# Patient Record
Sex: MALE | Race: WHITE | NOT HISPANIC OR LATINO | Employment: UNEMPLOYED | URBAN - METROPOLITAN AREA
[De-identification: names, ages, dates, MRNs, and addresses within clinical notes are randomized per-mention and may not be internally consistent; named-entity substitution may affect disease eponyms.]

---
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
## 2023-01-03 ENCOUNTER — OFFICE VISIT (OUTPATIENT)
Dept: FAMILY MEDICINE | Facility: CLINIC | Age: 18
End: 2023-01-03
Payer: COMMERCIAL

## 2023-01-03 VITALS
OXYGEN SATURATION: 98 % | RESPIRATION RATE: 12 BRPM | WEIGHT: 134.4 LBS | TEMPERATURE: 97.7 F | DIASTOLIC BLOOD PRESSURE: 69 MMHG | SYSTOLIC BLOOD PRESSURE: 106 MMHG | HEIGHT: 66 IN | BODY MASS INDEX: 21.6 KG/M2 | HEART RATE: 67 BPM

## 2023-01-03 DIAGNOSIS — M79.18 TENDERNESS OF BRACHIORADIALIS MUSCLE: Primary | ICD-10-CM

## 2023-01-03 DIAGNOSIS — Z23 NEED FOR PROPHYLACTIC VACCINATION AND INOCULATION AGAINST INFLUENZA: ICD-10-CM

## 2023-01-03 DIAGNOSIS — A04.8 H. PYLORI INFECTION: ICD-10-CM

## 2023-01-03 PROCEDURE — 90472 IMMUNIZATION ADMIN EACH ADD: CPT | Performed by: STUDENT IN AN ORGANIZED HEALTH CARE EDUCATION/TRAINING PROGRAM

## 2023-01-03 PROCEDURE — 90471 IMMUNIZATION ADMIN: CPT | Performed by: STUDENT IN AN ORGANIZED HEALTH CARE EDUCATION/TRAINING PROGRAM

## 2023-01-03 PROCEDURE — 90715 TDAP VACCINE 7 YRS/> IM: CPT | Performed by: STUDENT IN AN ORGANIZED HEALTH CARE EDUCATION/TRAINING PROGRAM

## 2023-01-03 PROCEDURE — 99213 OFFICE O/P EST LOW 20 MIN: CPT | Mod: 25 | Performed by: STUDENT IN AN ORGANIZED HEALTH CARE EDUCATION/TRAINING PROGRAM

## 2023-01-03 PROCEDURE — 90686 IIV4 VACC NO PRSV 0.5 ML IM: CPT | Performed by: STUDENT IN AN ORGANIZED HEALTH CARE EDUCATION/TRAINING PROGRAM

## 2023-01-03 NOTE — PROGRESS NOTES
Preceptor Attestation:    I discussed the patient with the resident and evaluated the patient in person. I have verified the content of the note, which accurately reflects my assessment of the patient and the plan of care.   Supervising Physician:  Sarkis Willis MD.

## 2023-01-03 NOTE — LETTER
January 3, 2023      Ranjit Mojica  [address] [facility] [address]  [place] [address]        To Whom It May Concern:    Ranjit Mojica was seen in our clinic. He may return to work with the following: limited to 8 hour workday for 2 weeks.      Sincerely,        Mayela Sommers MD
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
## 2023-01-03 NOTE — PATIENT INSTRUCTIONS
Thank you for coming to [facility] for your care.  It was a pleasure to take care of you!    Here is what we discussed:  Take naproxen 1 pill 2 times per day with food for swelling and pain in your arm  Apply gel to sore muscle 4 times per day  Send the H.pylori test back  Come back in 3 weeks to see Dr. Mcelroy.     Please call our clinic ([phone]) or send a message via seedtag with any questions or concerns!    Dr. Mayela Sommers

## 2023-01-20 ENCOUNTER — OFFICE VISIT (OUTPATIENT)
Dept: FAMILY MEDICINE | Facility: CLINIC | Age: 18
End: 2023-01-20
Payer: COMMERCIAL

## 2023-01-20 VITALS
RESPIRATION RATE: 18 BRPM | OXYGEN SATURATION: 100 % | DIASTOLIC BLOOD PRESSURE: 73 MMHG | HEART RATE: 79 BPM | SYSTOLIC BLOOD PRESSURE: 111 MMHG

## 2023-01-20 DIAGNOSIS — R20.0 NUMBNESS AND TINGLING OF RIGHT UPPER EXTREMITY: Primary | ICD-10-CM

## 2023-01-20 DIAGNOSIS — G47.9 SLEEPING DIFFICULTIES: ICD-10-CM

## 2023-01-20 DIAGNOSIS — R20.2 NUMBNESS AND TINGLING OF RIGHT UPPER EXTREMITY: Primary | ICD-10-CM

## 2023-01-20 DIAGNOSIS — R63.4 WEIGHT LOSS: ICD-10-CM

## 2023-01-20 PROCEDURE — 99214 OFFICE O/P EST MOD 30 MIN: CPT | Mod: GC | Performed by: STUDENT IN AN ORGANIZED HEALTH CARE EDUCATION/TRAINING PROGRAM

## 2023-01-20 RX ORDER — VITAMIN B COMPLEX
1 TABLET ORAL DAILY
Qty: 90 TABLET | Refills: 3 | Status: SHIPPED | OUTPATIENT
Start: 2023-01-20 | End: 2023-02-17

## 2023-01-20 RX ORDER — HYDROXYZINE HYDROCHLORIDE 25 MG/1
25 TABLET, FILM COATED ORAL EVERY 8 HOURS PRN
Qty: 60 TABLET | Refills: 0 | Status: SHIPPED | OUTPATIENT
Start: 2023-01-20 | End: 2023-02-17

## 2023-01-20 NOTE — PATIENT INSTRUCTIONS
Take hydroxyzine 25 mg at bedtime to help with sleep.  You can take an extra pill during the day.  It can make you sleepy.     You will get a phone call about scheduling an EMG to check the nerve pain    Take vitamin D everyday

## 2023-01-20 NOTE — LETTER
January 20, 2023      Ranjit Mojica  [address] [facility] [address]  [place] [address]        To Whom It May Concern:    Ranjit Mojica was seen in our clinic. He may return to school without restrictions.      Sincerely,        Mayela Sommers MD
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
## 2023-01-20 NOTE — LETTER
January 20, 2023      Ranjit Mojica  [address] [facility] [address]  [place] [address]        To Whom It May Concern:      Ranjit Mojica was seen in our clinic for injury.  Please do not schedule patient for overtime as he needs adequate rest for recovery.       Sincerely,        Mayela Sommers MD

## 2023-01-20 NOTE — NURSING NOTE
Due to patient being non-English speaking/uses sign language, an  was used for this visit. Only for face-to-face interpretation by an external agency, date and length of interpretation can be found on the scanned worksheet.     name: [id number]  Agency: [employer]  Language: Mingo   Telephone number: [phone]  Type of interpretation: Telephone, spoken

## 2023-01-20 NOTE — PROGRESS NOTES
Preceptor Attestation:    I discussed the patient with the resident and evaluated the patient in person. I have verified the content of the note, which accurately reflects my assessment of the patient and the plan of care.   Supervising Physician:  Pipe Gonzalez MD.

## 2023-01-23 ENCOUNTER — OFFICE VISIT (OUTPATIENT)
Dept: FAMILY MEDICINE | Facility: CLINIC | Age: 18
End: 2023-01-23
Payer: COMMERCIAL

## 2023-01-23 VITALS
RESPIRATION RATE: 20 BRPM | TEMPERATURE: 98.7 F | SYSTOLIC BLOOD PRESSURE: 117 MMHG | WEIGHT: 120 LBS | HEART RATE: 69 BPM | DIASTOLIC BLOOD PRESSURE: 78 MMHG | OXYGEN SATURATION: 99 % | BODY MASS INDEX: 19.67 KG/M2

## 2023-01-23 DIAGNOSIS — M62.838 MUSCLE SPASM: Primary | ICD-10-CM

## 2023-01-23 DIAGNOSIS — M54.2 NECK PAIN: ICD-10-CM

## 2023-01-23 PROCEDURE — 99213 OFFICE O/P EST LOW 20 MIN: CPT | Performed by: STUDENT IN AN ORGANIZED HEALTH CARE EDUCATION/TRAINING PROGRAM

## 2023-01-23 RX ORDER — BACLOFEN 10 MG/1
10 TABLET ORAL 3 TIMES DAILY
Qty: 15 TABLET | Refills: 0 | Status: SHIPPED | OUTPATIENT
Start: 2023-01-23 | End: 2023-03-09

## 2023-01-23 NOTE — LETTER
January 23, 2023      Ranjit Mojica  [address] [facility] [address]  [place] [address]        To Whom It May Concern:    Ranjit Mojica  was seen on 1/23/23. He should be light duty at work for 1 week due to injury      Sincerely,        Reena Felix PA-C

## 2023-01-23 NOTE — PROGRESS NOTES
ASSESSMENT & PLAN:   Diagnoses and all orders for this visit:  Muscle spasm  -     baclofen (LIORESAL) 10 MG tablet; Take 1 tablet (10 mg) by mouth 3 times daily  Neck pain    Bilateral shoulder/arm pain ongoing x 1 month, atraumatic. New onset neck pain/tightness today. Start baclofen as needed for muscle spasm. Work note provided to be light duty. Patient has been evaluated multiple times within the past month for same symptoms. He has an EMG ordered but has not gotten a call to schedule. Patient was provided with phone number to call and schedule this.     No follow-ups on file.    Patient Instructions   Your doctor has already ordered an EMG for you.   Call [phone] to get this scheduled.    May take baclofen as needed for muscle spasm/tightness.   Continue using diclofenac cream.       At the end of the encounter, I discussed results, diagnosis, medications. Discussed red flags for immediate return to clinic/ER, as well as indications for follow up if no improvement. Patient and/or caregiver understood and agreed to plan. Patient was stable for discharge.    ------------------------------------------------------------------------  SUBJECTIVE  Patient presents with:  Musculoskeletal Problem: Rt shoulder on/off sharp pain x 1 month. Pt states both hands but Rt hand more tough/stiff; unable to move. Pain radiates to Rt side of neck and pain both shoulders, tingling sensations both fingers.    HPI  Ranjit Mojica is a(n) 17 year old male presenting to clinic today for bilateral shoulder and arm pain x 1 month. No injury. Endorses right arm and hand stiffness. Symptoms worse with moving his arms. They tighten up and he cannot move them. Today began having neck pain. Endorses intermittent numbness, tingling in hands. Aggravated by movement. Improvement with nothing. Has been using diclofenac cream without relief. He has been seen multiple times by PCP for this. Initially was started on naproxen and diclofenac  without relief. He was seen again 3 days ago and an EMG was ordered. He has not received a phone call about getting this scheduled.      used throughout visit.     Review of Systems    Current Outpatient Medications   Medication Sig Dispense Refill     baclofen (LIORESAL) 10 MG tablet Take 1 tablet (10 mg) by mouth 3 times daily 15 tablet 0     benzoyl peroxide 5 % topical gel Apply topically daily (Patient not taking: Reported on 1/20/2023) 90 g 3     diclofenac (VOLTAREN) 1 % topical gel Apply 4 g topically 4 times daily (Patient not taking: Reported on 1/23/2023) 350 g 0     doxycycline hyclate (VIBRAMYCIN) 100 MG capsule Take 1 capsule (100 mg) by mouth daily for 90 days (Patient not taking: Reported on 1/20/2023) 90 capsule 0     famotidine (PEPCID) 20 MG tablet Take 1 tablet (20 mg) by mouth 2 times daily (Patient not taking: Reported on 1/20/2023) 30 tablet 3     hydrOXYzine (ATARAX) 25 MG tablet Take 1 tablet (25 mg) by mouth every 8 hours as needed for anxiety or other (sleep) (Patient not taking: Reported on 1/23/2023) 60 tablet 0     naproxen (NAPROSYN) 375 MG tablet Take 1 tablet (375 mg) by mouth 2 times daily (with meals) (Patient not taking: Reported on 1/20/2023) 20 tablet 0     polyethylene glycol (MIRALAX) 17 GM/Dose powder Take 17 g by mouth daily (Patient not taking: Reported on 1/20/2023) 510 g 1     Vitamin D3 (CHOLECALCIFEROL) 25 mcg (1000 units) tablet Take 1 tablet (25 mcg) by mouth daily (Patient not taking: Reported on 1/23/2023) 90 tablet 3     Problem List:  2022-05: Encounter for health examination of refugee    No Known Allergies      OBJECTIVE  Vitals:    01/23/23 1141   BP: 117/78   BP Location: Right arm   Patient Position: Sitting   Cuff Size: Adult Regular   Pulse: 69   Resp: 20   Temp: 98.7  F (37.1  C)   TempSrc: Oral   SpO2: 99%   Weight: 54.4 kg (120 lb)     Physical Exam   GENERAL: healthy, alert, no acute distress.   MSK: no spinous process tenderness of cervical  or thoracic spine. Tenderness with palpation of cervical paraspinal muscles. Mild tenderness palpation of upper trapezius. No bony tenderness in bilateral arms or hands. Slightly decreased forward flexion of right shoulder. 5/5 elbow flexion and extension.  strength strong and symmetric. Negative Tinel's bilaterally. Sensation bilateral upper extremities intact and symmetric.      No results found for any visits on 01/23/23.

## 2023-01-23 NOTE — LETTER
January 23, 2023      Ranjit Mojica  [address] [facility] [address]  [place] [address]        To Whom It May Concern:    Ranjit Mojica  was seen on 1/23/2023.  Please excuse him today due to injury.        Sincerely,        Reena Felix PA-C

## 2023-01-23 NOTE — PATIENT INSTRUCTIONS
Your doctor has already ordered an EMG for you.   Call [phone] to get this scheduled.    May take baclofen as needed for muscle spasm/tightness.   Continue using diclofenac cream.

## 2023-02-14 ENCOUNTER — OFFICE VISIT (OUTPATIENT)
Dept: FAMILY MEDICINE | Facility: CLINIC | Age: 18
End: 2023-02-14
Payer: COMMERCIAL

## 2023-02-14 VITALS
OXYGEN SATURATION: 97 % | DIASTOLIC BLOOD PRESSURE: 70 MMHG | HEART RATE: 78 BPM | WEIGHT: 129.6 LBS | HEIGHT: 66 IN | TEMPERATURE: 97.6 F | SYSTOLIC BLOOD PRESSURE: 113 MMHG | RESPIRATION RATE: 16 BRPM | BODY MASS INDEX: 20.83 KG/M2

## 2023-02-14 DIAGNOSIS — R63.4 WEIGHT LOSS: ICD-10-CM

## 2023-02-14 DIAGNOSIS — F33.2 SEVERE EPISODE OF RECURRENT MAJOR DEPRESSIVE DISORDER, WITHOUT PSYCHOTIC FEATURES (H): ICD-10-CM

## 2023-02-14 DIAGNOSIS — F41.1 GAD (GENERALIZED ANXIETY DISORDER): ICD-10-CM

## 2023-02-14 DIAGNOSIS — H53.8 BLURRED VISION: ICD-10-CM

## 2023-02-14 DIAGNOSIS — A04.8 H. PYLORI INFECTION: ICD-10-CM

## 2023-02-14 LAB
ALBUMIN SERPL BCG-MCNC: 4.4 G/DL (ref 3.5–5.2)
ALP SERPL-CCNC: 108 U/L (ref 40–129)
ALT SERPL W P-5'-P-CCNC: 16 U/L (ref 10–50)
ANION GAP SERPL CALCULATED.3IONS-SCNC: 10 MMOL/L (ref 7–15)
AST SERPL W P-5'-P-CCNC: 22 U/L (ref 10–50)
BILIRUB SERPL-MCNC: 0.4 MG/DL
BUN SERPL-MCNC: 12.5 MG/DL (ref 6–20)
CALCIUM SERPL-MCNC: 9.6 MG/DL (ref 8.6–10)
CHLORIDE SERPL-SCNC: 104 MMOL/L (ref 98–107)
CREAT SERPL-MCNC: 0.85 MG/DL (ref 0.67–1.17)
DEPRECATED HCO3 PLAS-SCNC: 27 MMOL/L (ref 22–29)
ERYTHROCYTE [DISTWIDTH] IN BLOOD BY AUTOMATED COUNT: 11.6 % (ref 10–15)
GFR SERPL CREATININE-BSD FRML MDRD: >90 ML/MIN/1.73M2
GLUCOSE SERPL-MCNC: 82 MG/DL (ref 70–99)
HCT VFR BLD AUTO: 45.9 % (ref 40–53)
HGB BLD-MCNC: 15.6 G/DL (ref 13.3–17.7)
MCH RBC QN AUTO: 27.9 PG (ref 26.5–33)
MCHC RBC AUTO-ENTMCNC: 34 G/DL (ref 31.5–36.5)
MCV RBC AUTO: 82 FL (ref 78–100)
PLATELET # BLD AUTO: 137 10E3/UL (ref 150–450)
POTASSIUM SERPL-SCNC: 4.2 MMOL/L (ref 3.4–5.3)
PROT SERPL-MCNC: 6.9 G/DL (ref 6.4–8.3)
RBC # BLD AUTO: 5.59 10E6/UL (ref 4.4–5.9)
SODIUM SERPL-SCNC: 141 MMOL/L (ref 136–145)
TSH SERPL DL<=0.005 MIU/L-ACNC: 0.53 UIU/ML (ref 0.5–4.3)
WBC # BLD AUTO: 3.2 10E3/UL (ref 4–11)

## 2023-02-14 PROCEDURE — 85027 COMPLETE CBC AUTOMATED: CPT | Performed by: STUDENT IN AN ORGANIZED HEALTH CARE EDUCATION/TRAINING PROGRAM

## 2023-02-14 PROCEDURE — 36415 COLL VENOUS BLD VENIPUNCTURE: CPT | Performed by: STUDENT IN AN ORGANIZED HEALTH CARE EDUCATION/TRAINING PROGRAM

## 2023-02-14 PROCEDURE — 80053 COMPREHEN METABOLIC PANEL: CPT | Performed by: STUDENT IN AN ORGANIZED HEALTH CARE EDUCATION/TRAINING PROGRAM

## 2023-02-14 PROCEDURE — 84443 ASSAY THYROID STIM HORMONE: CPT | Performed by: STUDENT IN AN ORGANIZED HEALTH CARE EDUCATION/TRAINING PROGRAM

## 2023-02-14 PROCEDURE — 99214 OFFICE O/P EST MOD 30 MIN: CPT | Mod: GC | Performed by: STUDENT IN AN ORGANIZED HEALTH CARE EDUCATION/TRAINING PROGRAM

## 2023-02-14 RX ORDER — ESCITALOPRAM OXALATE 10 MG/1
10 TABLET ORAL DAILY
Qty: 60 TABLET | Refills: 0 | Status: SHIPPED | OUTPATIENT
Start: 2023-02-14 | End: 2023-03-09

## 2023-02-14 ASSESSMENT — ANXIETY QUESTIONNAIRES
5. BEING SO RESTLESS THAT IT IS HARD TO SIT STILL: MORE THAN HALF THE DAYS
1. FEELING NERVOUS, ANXIOUS, OR ON EDGE: NEARLY EVERY DAY
GAD7 TOTAL SCORE: 19
2. NOT BEING ABLE TO STOP OR CONTROL WORRYING: NEARLY EVERY DAY
7. FEELING AFRAID AS IF SOMETHING AWFUL MIGHT HAPPEN: NEARLY EVERY DAY
3. WORRYING TOO MUCH ABOUT DIFFERENT THINGS: NEARLY EVERY DAY
6. BECOMING EASILY ANNOYED OR IRRITABLE: MORE THAN HALF THE DAYS
GAD7 TOTAL SCORE: 19

## 2023-02-14 ASSESSMENT — PATIENT HEALTH QUESTIONNAIRE - PHQ9
5. POOR APPETITE OR OVEREATING: NEARLY EVERY DAY
SUM OF ALL RESPONSES TO PHQ QUESTIONS 1-9: 21

## 2023-02-14 NOTE — LETTER
February 14, 2023      Ranjit Mojica  [address] [facility] [address]  [place] [address]        To whom it may concern:    Ranjit Mojica was seen at [facility] on February 14, 2023. Please excuse his absence for today. Patient should not work more than 40 hours per week to avoid worsening his medical conditions.    Sincerely,    Khoa Mcelroy MD

## 2023-02-14 NOTE — PROGRESS NOTES
"Preceptor Attestation:  Vitals:    02/14/23 1003   BP: 113/70   BP Location: Right arm   Patient Position: Sitting   Cuff Size: Adult Regular   Pulse: 78   Resp: 16   Temp: 97.6  F (36.4  C)   TempSrc: Oral   SpO2: 97%   Weight: 54.4 kg (120 lb)   Height: 1.664 m (5' 5.5\")          I discussed the patient with the resident and evaluated the patient in person. I have verified the content of the note, which accurately reflects my assessment of the patient and the plan of care.   Supervising Physician:  Mary Chandler MD    "

## 2023-02-14 NOTE — PROGRESS NOTES
Assessment and Plan     Addendum 3/13/23:   Patient's H pylori stool antigen test was still positive despite earlier Bismuth based quadruple therapy. Will prescribe salvage therapy with levofloxacin 500mg daily, amoxicillin 750mg TID, and omeprazole 40mg BID for 14 day course. Patient will need to have repeat stool testing to confirm eradication 4 weeks after completing antibiotic/PPI therapy. If still positive, would need to refer to GI for EGD for H pylori culture and sensitivities.     Ranjit was seen today for anxiety, numbness, medication reconciliation and referral.    Diagnoses and all orders for this visit:    Severe episode of recurrent major depressive disorder, without psychotic features (H)  MILDRED (generalized anxiety disorder)  Patient history suggests MDD and MILDRED of 2 months in setting of  stress balancing school, work, and not being with his fiance whose safety he is concerned about. TSH normal. He was agreeable to trial of escitalopram and referral for therapy. Provided a work letter indicating that he should not be required to work overtime.  -     TSH with free T4 reflex  -     Adult Mental [facility] Referral; Future  -     escitalopram (LEXAPRO) 10 MG tablet; Take 1 tablet (10 mg) by mouth daily Take 5mg (half tablet) for the first week    H. pylori infection  Completed treatment months ago, but has not yet provided stool sample to evaluate for eradication.  -     Helicobacter pylori Antigen Stool    Weight loss  BMP, CBC, and TSH were normal other than mild leukopenia and thrombocytopenia not much lower than his baseline. Suspect unintentional weight loss of ~13lbs in ~4 months [142 to 129lb; 120lb weight was only reported by patient] is due to MDD and MILDRED above, though, cannot rule out failed H pylori treatment until stool test completed.   -     CBC with platelets  -     Comprehensive metabolic panel  -     TSH with free T4 reflex  -     Helicobacter pylori Antigen Stool    Blurred  vision  Intermittent bilateral blurry vision. Had ~20/30 vision in clinic. Referral placed.  -     Adult Eye  Referral; Future       Diagnosis or treatment significantly limited by social determinants of health - language barrier; Italian  used  Ordering of each unique test    Options for treatment and follow-up care were reviewed with the patient. Patient engaged in the decision making process and verbalized understanding of the options discussed and agreed with the final plan.    Patient was staffed with attending physician Dr. Chandler.    Khoa Mcelroy MD PGY-3           HPI       Ranjit Mojica is a 17 year old year old male w/ PMH of   Patient Active Problem List   Diagnosis     Encounter for health examination of refugee    who presents for   Chief Complaint   Patient presents with     Anxiety     Pt state feeling better today, pt request for a note to decrease his hour of work and check on med, help increase on appetite     Numbness     Right arm       Medication Reconciliation     Med reviewed, complete course of med provider have given and currently not taking any med     Referral     Like to get a assessment done to determine his nerves problem     Right arm numbness  Has not yet heard regarding scheduling EMG ([phone]), but notes that arm symptoms have improved, but still present.    Eyes  Decreased/blurry vision and seeing shadows/after-image for the past 2 weeks; this occurs mainly at night time, but has occurred a few times during the daytime while at school.     Mood  Also notes worsening anxiety, headache for the past 2 months. Ongoing depression. Stressor includes school, work, and not being with his fiance (she left [place] due to the Taliban and is now in [place]). Notes chronic decreased appetite with associated weight loss;     A Badoo  was used for  this visit.           Review of Systems:   10 point ROS negative other than as specified above.          "Physical Exam:   /70 (BP Location: Right arm, Patient Position: Sitting, Cuff Size: Adult Regular)   Pulse 78   Temp 97.6  F (36.4  C) (Oral)   Resp 16   Ht 1.664 m (5' 5.5\")   Wt 58.8 kg (129 lb 9.6 oz)   SpO2 97%   BMI 21.24 kg/m      Vitals were reviewed and were normal  Wt Readings from Last 4 Encounters:   02/17/23 58.5 kg (129 lb) (10 %, Z= -1.27)*   02/14/23 58.8 kg (129 lb 9.6 oz) (11 %, Z= -1.23)*   01/23/23 54.4 kg (120 lb) (3 %, Z= -1.81)*   01/03/23 61 kg (134 lb 6.4 oz) (17 %, Z= -0.96)*     * Growth percentiles are based on [facility] (Boys, 2-20 Years) data.        Exam:  Constitutional: alert, cooperative, tired   Head: normocephalic  Cardiovascular: appears well perfused; RRR w/o audible murmur  Respiratory: breathing comfortably on RA; bilateral clear lungs w/o wheezing, crackles or rhonchi  Abdomen: soft, non-tender, non-distended; no CVA tenderness  Musculoskeletal: extremities normal- no gross deformities noted, gait normal  Skin: no suspicious lesions or rashes on exposed skin  Neurologic: grossly normal CN  Psychiatric: mentation appears normal      Results:      Results from this visit  Results for orders placed or performed in visit on 02/14/23   CBC with platelets     Status: Abnormal   Result Value Ref Range    WBC Count 3.2 (L) 4.0 - 11.0 10e3/uL    RBC Count 5.59 4.40 - 5.90 10e6/uL    Hemoglobin 15.6 13.3 - 17.7 g/dL    Hematocrit 45.9 40.0 - 53.0 %    MCV 82 78 - 100 fL    MCH 27.9 26.5 - 33.0 pg    MCHC 34.0 31.5 - 36.5 g/dL    RDW 11.6 10.0 - 15.0 %    Platelet Count 137 (L) 150 - 450 10e3/uL   Comprehensive metabolic panel     Status: Normal   Result Value Ref Range    Sodium 141 136 - 145 mmol/L    Potassium 4.2 3.4 - 5.3 mmol/L    Chloride 104 98 - 107 mmol/L    Carbon Dioxide (CO2) 27 22 - 29 mmol/L    Anion Gap 10 7 - 15 mmol/L    Urea Nitrogen 12.5 6.0 - 20.0 mg/dL    Creatinine 0.85 0.67 - 1.17 mg/dL    Calcium 9.6 8.6 - 10.0 mg/dL    Glucose 82 70 - 99 mg/dL    Alkaline " Phosphatase 108 40 - 129 U/L    AST 22 10 - 50 U/L    ALT 16 10 - 50 U/L    Protein Total 6.9 6.4 - 8.3 g/dL    Albumin 4.4 3.5 - 5.2 g/dL    Bilirubin Total 0.4 <=1.2 mg/dL    GFR Estimate >90 >60 mL/min/1.73m2   TSH with free T4 reflex     Status: Normal   Result Value Ref Range    TSH 0.53 0.50 - 4.30 uIU/mL

## 2023-02-14 NOTE — PATIENT INSTRUCTIONS
CRISIS LINES/SERVICES  If in Immediate danger please go to the ER and/or call 9-1-1  Feeling overwhelmed and just need a supportive person to talk through a struggling time?   Toll Free [phone] or text  Support  to [phone] (hours 12 PM - 10 PM CST)  The [place] Warmline provides a fopt-xe-cmsd approach to mental health recovery, support and wellness. Calls are answered by professionally trained Certified Peer Specialists, who have first hand experience living with a mental health condition. (hours 12 PM - 10 PM CST)    Do you feel like you might be in crisis and potentially need professional services?   National Crisis Lines:  5-453-OMPQFDO (1-757.612.7778) - www.[employer]  8-206-048-TALK (8571) - www.suicidepreventionlifeline.org  [place]:  Crisis Text Line: Text [place] to 444815. Free support at your fingertips 24/7  People who text [place] to 280073 will be connected with a counselor. Crisis Text Line is available 24 hours a day, seven days a week.  [facility] Multilingual Crisis Line:  [phone]  [facility]:  COPE - Adult (psychiatric crisis, but cannot transport self): [phone]   COPE - Child: [phone]  Acute Psychiatric Services - [facility] (24 hour crisis walk-in and crisis line): [phone]  [address], [place]  [facility] (Emergency Psychiatric Evaluation): [phone]  [facility]:  [facility] Adult Crisis Line (crisis counseling and walk-in urgent care mental health): [phone]   Adult Residential Crisis Centers:   [employer] operates two Adult Residential Crisis Centers in the area: [facility] and [facility] the [facility] ([place])  These facilities are a step below in-patient hospital care, providing a three to ten-day stay for individuals who are at a moderate but not a high risk for self-harm. The crisis centers and other [employer] programs can be reached through their central screening at [phone].  Domestic  Violence:  [place] Domestic Violence Crisis Line (24-hour [place] crisis line) 9-292-134-2486    If in Immediate danger please go to the ER and/or call 9-1-1

## 2023-02-17 ENCOUNTER — OFFICE VISIT (OUTPATIENT)
Dept: FAMILY MEDICINE | Facility: CLINIC | Age: 18
End: 2023-02-17
Payer: COMMERCIAL

## 2023-02-17 VITALS
BODY MASS INDEX: 21.14 KG/M2 | OXYGEN SATURATION: 99 % | HEART RATE: 85 BPM | WEIGHT: 129 LBS | SYSTOLIC BLOOD PRESSURE: 124 MMHG | DIASTOLIC BLOOD PRESSURE: 84 MMHG | TEMPERATURE: 98.3 F | RESPIRATION RATE: 20 BRPM

## 2023-02-17 DIAGNOSIS — J06.9 VIRAL UPPER RESPIRATORY TRACT INFECTION: Primary | ICD-10-CM

## 2023-02-17 LAB — SARS-COV-2 RNA RESP QL NAA+PROBE: NEGATIVE

## 2023-02-17 PROCEDURE — 99213 OFFICE O/P EST LOW 20 MIN: CPT | Mod: CS | Performed by: FAMILY MEDICINE

## 2023-02-17 PROCEDURE — U0005 INFEC AGEN DETEC AMPLI PROBE: HCPCS | Performed by: FAMILY MEDICINE

## 2023-02-17 PROCEDURE — U0003 INFECTIOUS AGENT DETECTION BY NUCLEIC ACID (DNA OR RNA); SEVERE ACUTE RESPIRATORY SYNDROME CORONAVIRUS 2 (SARS-COV-2) (CORONAVIRUS DISEASE [COVID-19]), AMPLIFIED PROBE TECHNIQUE, MAKING USE OF HIGH THROUGHPUT TECHNOLOGIES AS DESCRIBED BY CMS-2020-01-R: HCPCS | Performed by: FAMILY MEDICINE

## 2023-02-17 RX ORDER — NAPROXEN 500 MG/1
500 TABLET ORAL 2 TIMES DAILY WITH MEALS
Qty: 28 TABLET | Refills: 0 | Status: SHIPPED | OUTPATIENT
Start: 2023-02-17 | End: 2023-03-03

## 2023-02-17 NOTE — LETTER
RETURN TO WORK/SCHOOL FORM    2/17/2023    Re: Ranjit Mojica  2005      To Whom It May Concern:     Ranjit Mojica was seen in clinic today.  He has been absent for medical illness.  He may return to work and school without restrictions on 2/21/23.       Restrictions:  None      Davonte Soliz MD  2/17/2023 8:49 AM

## 2023-02-17 NOTE — PROGRESS NOTES
Assessment & Plan     Viral upper respiratory tract infection  Symptoms of URI.  Were having very little influenza in the community right now so we will not check that.  We will check COVID swab for public health reasons.  We will treat empirically with naproxen and given a note excusing him for 5 days total and he can return to work on the sixth day but, if the COVID test is positive he will need to wear a mask for 5 additional days.  - naproxen (NAPROSYN) 500 MG tablet; Take 1 tablet (500 mg) by mouth 2 times daily (with meals) for 14 days  - Symptomatic COVID-19 Virus (Coronavirus) by PCR Nasopharyngeal    Diagnosis or treatment significantly limited by social determinants of health - Language barrier  20 minutes spent on the date of the encounter doing patient visit, documentation and letter          Davonte Soliz MD  [facility]    Options for treatment and/or follow-up care were reviewed with the patient. Ranjit Mojica was engaged and actively involved in the decision making process. He verbalized understanding of the options discussed and was satisfied with the final plan.      Subjective: Ranjit Mojica is a 17 year old coming in with the following concerns:  (phone  - Yoruba)  Flu like symptoms for 2 days: coryza, nasal congestion, body aches, dizzy, sneezing,  No cough.  No sick contacts. Did not check a  COVID test. Tried some OTC flu medicine.  He has been absent from school and work for the last 2 days and needs a letter excusing him.  He is afraid he will get fired from his job for not being there.  PMHX/PSHX/MEDS/ALLERGIES/SHX/FHX reviewed and updated in Epic.   ROS:   General: No fevers, chills   ENT: Some pain in the right ear  Head: No headache   CV: No chest pain   Resp: No shortness of breath. No cough. No hemoptysis.   GI: Some nausea yesterday but no vomiting.  No constipation or diarrhea  Objective: /84   Pulse 85   Temp 98.3  F (36.8  C) (Oral)    Resp 20   Wt 58.5 kg (129 lb)   SpO2 99%   BMI 21.14 kg/m     Gen: Well nourished and in NAD   HEENT: TMs normal color and landmarks, nasopharynx pink and moist, oropharynx pink and moist  Neck supple without lymphadenopathy  CV: RRR - no murmurs, rubs, or gallups  Pulm: Clear to auscultation without wheezing or crackles  ABD: soft, nontender, BS intact  Extrem: no cyanosis, edema or clubbing   Psych: Euthymic

## 2023-02-21 ENCOUNTER — ALLIED HEALTH/NURSE VISIT (OUTPATIENT)
Dept: FAMILY MEDICINE | Facility: CLINIC | Age: 18
End: 2023-02-21
Payer: COMMERCIAL

## 2023-02-21 VITALS — TEMPERATURE: 97.8 F

## 2023-02-21 DIAGNOSIS — Z23 NEED FOR PROPHYLACTIC VACCINATION AND INOCULATION AGAINST SINGLE DISEASE: Primary | ICD-10-CM

## 2023-02-21 PROCEDURE — 90744 HEPB VACC 3 DOSE PED/ADOL IM: CPT

## 2023-02-21 PROCEDURE — 99207 PR NO CHARGE NURSE ONLY: CPT

## 2023-02-21 PROCEDURE — 90471 IMMUNIZATION ADMIN: CPT

## 2023-02-22 NOTE — TELEPHONE ENCOUNTER
FUTURE VISIT INFORMATION      FUTURE VISIT INFORMATION:    Date: 3/2/23    Time: 2:40pm    Location: [facility]  REFERRAL INFORMATION:    Referring provider:  Mary Chandler MD    Referring providers clinic:  [facility]    Reason for visit/diagnosis  Comprehensive Eye Exam    RECORDS REQUESTED FROM:       Clinic name Comments Records Status Imaging Status   [facility] OV/referral 2/14/23 EPIC

## 2023-03-02 ENCOUNTER — OFFICE VISIT (OUTPATIENT)
Dept: OPHTHALMOLOGY | Facility: CLINIC | Age: 18
End: 2023-03-02
Attending: STUDENT IN AN ORGANIZED HEALTH CARE EDUCATION/TRAINING PROGRAM
Payer: COMMERCIAL

## 2023-03-02 ENCOUNTER — PRE VISIT (OUTPATIENT)
Dept: OPHTHALMOLOGY | Facility: CLINIC | Age: 18
End: 2023-03-02

## 2023-03-02 DIAGNOSIS — H53.8 BLURRED VISION: Primary | ICD-10-CM

## 2023-03-02 DIAGNOSIS — H52.203 MYOPIC ASTIGMATISM OF BOTH EYES: ICD-10-CM

## 2023-03-02 DIAGNOSIS — H04.123 DRY EYE SYNDROME OF BOTH EYES: ICD-10-CM

## 2023-03-02 DIAGNOSIS — D31.42: ICD-10-CM

## 2023-03-02 DIAGNOSIS — H52.13 MYOPIC ASTIGMATISM OF BOTH EYES: ICD-10-CM

## 2023-03-02 PROCEDURE — 92004 COMPRE OPH EXAM NEW PT 1/>: CPT | Performed by: OPHTHALMOLOGY

## 2023-03-02 ASSESSMENT — CONF VISUAL FIELD
OS_NORMAL: 1
OS_INFERIOR_TEMPORAL_RESTRICTION: 0
OS_INFERIOR_NASAL_RESTRICTION: 0
OD_NORMAL: 1
OD_SUPERIOR_NASAL_RESTRICTION: 0
OD_INFERIOR_NASAL_RESTRICTION: 0
OS_SUPERIOR_TEMPORAL_RESTRICTION: 0
OD_INFERIOR_TEMPORAL_RESTRICTION: 0
OD_SUPERIOR_TEMPORAL_RESTRICTION: 0
OS_SUPERIOR_NASAL_RESTRICTION: 0
METHOD: COUNTING FINGERS

## 2023-03-02 ASSESSMENT — REFRACTION
OS_CYLINDER: +0.50
OD_AXIS: 025
OD_SPHERE: -0.50
OS_SPHERE: -1.00
OD_CYLINDER: +0.50
OS_AXIS: 140

## 2023-03-02 ASSESSMENT — VISUAL ACUITY
OD_CC: 20/25
OS_CC+: +2
METHOD: SNELLEN - LINEAR
OS_CC: 20/30
OD_CC+: -2

## 2023-03-02 ASSESSMENT — CUP TO DISC RATIO
OS_RATIO: 0.45
OD_RATIO: 0.45

## 2023-03-02 ASSESSMENT — TONOMETRY
IOP_METHOD: ICARE
OS_IOP_MMHG: 09
OD_IOP_MMHG: 09

## 2023-03-02 ASSESSMENT — REFRACTION_MANIFEST
OD_SPHERE: -0.75
OS_AXIS: 146
OS_SPHERE: -1.50
OS_CYLINDER: +0.50
OD_CYLINDER: +0.50
OD_AXIS: 028

## 2023-03-02 ASSESSMENT — REFRACTION_WEARINGRX
OD_AXIS: 010
OS_AXIS: 172
OS_SPHERE: -1.25
OS_CYLINDER: +0.75
OD_SPHERE: -1.25
OD_CYLINDER: +0.50

## 2023-03-02 ASSESSMENT — EXTERNAL EXAM - LEFT EYE: OS_EXAM: NORMAL

## 2023-03-02 NOTE — NURSING NOTE
Chief Complaints and History of Present Illnesses   Patient presents with     Annual Eye Exam     Chief Complaint(s) and History of Present Illness(es)     Annual Eye Exam            Associated symptoms: Negative for dryness, eye pain, flashes and floaters    Treatments tried: no treatments    Pain scale: 0/10          Comments    Patient present for annual exam. Patient states last eye exam was 1.5 years ago. Patient only wears his glasses when reading but never any other time because it gives him a headache.   .austind

## 2023-03-02 NOTE — PROGRESS NOTES
HPI  Ranjit Mojica is a 17 year old male here for comprehensive eye exam. Vision is blurry looking at his phone.  Wearing old glasses for distance gave him headache.  Eyes burn and sting daily he is wondering if his long hair or hot work environment are contributing. When he washes his face his eyes feel better until his face dries, then they burn again.     PMH: History reviewed. No pertinent past medical history.   POH: Glasses for myopia, no surgery, no trauma  Oc Meds: none  FH: Denies any glaucoma, age related macular degeneration, or other known eye diseases         Assessment & Plan        1. Blurred vision - Both Eyes    2. Myopic astigmatism of both eyes - Both Eyes    3. Dry eye syndrome of both eyes - Both Eyes    4. Nevus of iris, left - Left Eye      Over minused some, manifest refraction done and prescription for glasses given.  Oily lids/tear film, blepharitis causing burning.    Recommend warm compresses 1-2 times daily. Lid scrubs morning and evening: use diluted baby shampoo or Ocusoft foam on warm washcloth to clean lid margins carefully from side to side.  Artificial tears 4-6 times per day as needed for discomfort- ie Systane, buy over the counter (did try sending prescription to pharmacy)  Patient educated about the importance of lid hygiene to help alleviate symptoms of this chronic condition.     -----------------------------------------------------------------------------------       Patient disposition:   Return in about 3 months (around 6/2/2023) for follow up- dry eye. Patient to call sooner as needed.    Complete documentation of historical and exam elements from today's encounter can be found in the full encounter summary report (not reduplicated in this progress note). I personally obtained the chief complaint(s) and history of present illness.  I have confirmed and edited as necessary the CC, HPI, PMH/PSH, social history, FMH, ROS, and exam/neuro findings as obtained by the  technician or others. I have examined this patient myself and I personally viewed the image(s) and studies listed above and the documentation reflects my findings and interpretation.     Christina Vega MD

## 2023-03-02 NOTE — LETTER
2023      Re: Ranjit Mojica  :   2005    To Whom It May Concern:    This is to confirm that the above patient was seen on 2023.  Ranjit Mojica is able to return to work tomorrow.    Thank you for your cooperation in this matter.  Please do not hesitate to contact me if you have any further questions.    Sincerely,        Christina Vega MD  Comprehensive Ophthalmology  [facility]

## 2023-03-09 ENCOUNTER — OFFICE VISIT (OUTPATIENT)
Dept: FAMILY MEDICINE | Facility: CLINIC | Age: 18
End: 2023-03-09
Payer: COMMERCIAL

## 2023-03-09 VITALS
DIASTOLIC BLOOD PRESSURE: 65 MMHG | WEIGHT: 131 LBS | HEART RATE: 78 BPM | BODY MASS INDEX: 21.83 KG/M2 | SYSTOLIC BLOOD PRESSURE: 103 MMHG | HEIGHT: 65 IN | TEMPERATURE: 98 F | RESPIRATION RATE: 20 BRPM | OXYGEN SATURATION: 99 %

## 2023-03-09 DIAGNOSIS — F41.1 GAD (GENERALIZED ANXIETY DISORDER): ICD-10-CM

## 2023-03-09 DIAGNOSIS — Z00.00 ROUTINE GENERAL MEDICAL EXAMINATION AT A HEALTH CARE FACILITY: ICD-10-CM

## 2023-03-09 DIAGNOSIS — Z00.00 ANNUAL PHYSICAL EXAM: ICD-10-CM

## 2023-03-09 DIAGNOSIS — F33.2 SEVERE EPISODE OF RECURRENT MAJOR DEPRESSIVE DISORDER, WITHOUT PSYCHOTIC FEATURES (H): ICD-10-CM

## 2023-03-09 DIAGNOSIS — L70.0 ACNE VULGARIS: ICD-10-CM

## 2023-03-09 DIAGNOSIS — R63.4 WEIGHT LOSS: Primary | ICD-10-CM

## 2023-03-09 PROCEDURE — 90471 IMMUNIZATION ADMIN: CPT

## 2023-03-09 PROCEDURE — 90651 9VHPV VACCINE 2/3 DOSE IM: CPT

## 2023-03-09 PROCEDURE — 99395 PREV VISIT EST AGE 18-39: CPT | Mod: 25

## 2023-03-09 PROCEDURE — 87338 HPYLORI STOOL AG IA: CPT | Performed by: STUDENT IN AN ORGANIZED HEALTH CARE EDUCATION/TRAINING PROGRAM

## 2023-03-09 RX ORDER — CHOLECALCIFEROL (VITAMIN D3) 50 MCG
1 TABLET ORAL DAILY
Qty: 90 TABLET | Refills: 3 | Status: SHIPPED | OUTPATIENT
Start: 2023-03-09 | End: 2023-05-16

## 2023-03-09 RX ORDER — CLINDAMYCIN PHOSPHATE AND BENZOYL PEROXIDE 10; 50 MG/G; MG/G
GEL TOPICAL
Qty: 45 G | Refills: 3 | Status: SHIPPED | OUTPATIENT
Start: 2023-03-09 | End: 2023-05-16

## 2023-03-09 RX ORDER — ESCITALOPRAM OXALATE 10 MG/1
20 TABLET ORAL DAILY
Qty: 60 TABLET | Refills: 3 | Status: SHIPPED | OUTPATIENT
Start: 2023-03-09 | End: 2023-04-19

## 2023-03-09 NOTE — LETTER
[facility] [address] [place] [address]  Phone: [phone]  Fax: [phone]    March 9, 2023        Ranjit Mojica  [address] [place] [address] [address]  [place] [address]          To whom it may concern:    RE: Ranjit Mojica was seen today for a medical appointment. Please excuse Ranjit Mojica from school today.    Please contact me for questions or concerns.      Sincerely,        Polina Briones MD

## 2023-03-09 NOTE — PATIENT INSTRUCTIONS
Thank you for taking the time to discuss your health with me today!    Today we discussed:  1. We will increase your Lexapro from 10 mg to 20 mg daily. Take 10 mg once per day for a week and then increase.  2.  Follow up in 2-4 weeks. We are also trying to set up CBT therapy for you.  3. I have refilled your Vitamin D, take once daily.   4. I have ordered cream for your acne, please apply this nightly before bed. Avoid the sun.     As always, please call the clinic or message with any questions or concerns.     Anoop Wishes,  Polina Briones MD.     Preventive Health Recommendations  Male Ages 18 - 20     Yearly exam:             See your health care provider every year in order to  o   Review health changes.   o   Discuss preventive care.    o   Review your medicines if your doctor has prescribed any.    You should be tested each year for STDs (sexually transmitted diseases).     Talk to your provider about cholesterol testing.      If you are at risk for diabetes, you should have a diabetes test (fasting glucose).    Shots: Get a flu shot each year. Get a tetanus shot every 10 years.     Nutrition:    Eat at least 5 servings of fruits and vegetables daily.     Eat whole-grain bread, whole-wheat pasta and brown rice instead of white grains and rice.     Get adequate calcium and Vitamin D.     Lifestyle    Exercise for at least 150 minutes a week (30 minutes a day, 5 days a week). This will help you control your weight and prevent disease.     No smoking.     Wear sunscreen to prevent skin cancer.     See your dentist every six months for an exam and cleaning.

## 2023-03-09 NOTE — NURSING NOTE
Passing his hearing.  Hear all tones.  Patient wear glasses.  Did not wear during appt visit.  Unable to do vision screen.    Joce Rm MA

## 2023-03-09 NOTE — LETTER
[facility] [address] [place] [address]  Phone: [phone]  Fax: [phone]    March 9, 2023        Ranjit Mojica  [address] [place] [address] [address]  [place] [address]          To whom it may concern:    RE: Ranjit Mojica was seen on 2/14/23 for a medical condition and told to stay home for 5 days. Please excuse him from work during that time.     Please contact me for questions or concerns.      Sincerely,        Polina Briones MD

## 2023-03-09 NOTE — PROGRESS NOTES
Due to patient being non-English speaking/uses sign language, an  was used for this visit. Only for face-to-face interpretation by an external agency, date and length of interpretation can be found on the scanned worksheet.     name: Deborah  Agency: [employer]  Language: pushto   Telephone number:  Type of interpretation: Telephone, spoken

## 2023-03-09 NOTE — PROGRESS NOTES
Preventive Care Visit  [facility]  Polina Briones MD, Student in organized health care education/training program  Mar 9, 2023  Assessment & Plan   18 year old, here for preventive care. Continues to struggle with anxiety and depression, feeling more irritable. He did not  Lexapro once he ran out but is agreeable to trying this again. Has ongoing abdominal pain, but did not understanding picking up supplies in lab to retest. I assisted him with getting this today. Will have him follow up in 4 weeks to re-eval mood. He is in the process of being hopefully accepted for CBT therapy. Has gained 2-3 lbs in 1 months so weight seems to be improving.     Annual Exam  Comment: Doing well overall. Weight improving 2-3 lbs. Will send for trial of clinda-benzoyl for acne.     (R63.4) Weight loss  (primary encounter diagnosis)  Comment: Has hx of weight loss from 140 lb to 120 lbs. Is 130 lbs today and up from last month so seems to be doing better.   - Refill Vitamin D    (F33.2) Severe episode of recurrent major depressive disorder, without psychotic features (H)  (F41.1) MILDRED (generalized anxiety disorder)  Comment: Continues to report anxiety and depression symptoms, very irritable at times. Did not  Lexapro or continue once ran out, did not know about refills. He is agreeable to trial again and follow up. He is supposed to be evaluated for CBT as well.   - Lexapro 10 mg daily x 1 week, then increase to 20 mg daily   - Follow up in 4 weeks    Patient has been advised of split billing requirements and indicates understanding: Yes  Growth      Normal height and weight    Immunizations   Appropriate vaccinations were ordered.  Immunizations Administered     Name Date Dose VIS Date Route    HPV9 3/9/23 10:46 AM 0.5 mL 08/06/2021, Given Today Intramuscular        Anticipatory Guidance    Reviewed age appropriate anticipatory guidance.     Increased responsibility    TV/ media    Healthy food  "choices    Calcium     Vitamins/ supplements    Weight management    Sleep issues    Dental care    Body image    Safe sex/ STDs      Referrals/Ongoing Specialty Care  None  Verbal Dental Referral: Verbal dental referral was given      Follow Up      Return in about 4 weeks (around 4/6/2023).    Subjective   Additional Questions 3/9/2023   Accompanied by brother     No flowsheet data found.     No flowsheet data found.     Recent Labs   Lab Test 05/10/22  1428   CHOL 168   HDL 42   LDL 94   TRIG 158*     No flowsheet data found.  No flowsheet data found.No flowsheet data found.No flowsheet data found.  No flowsheet data found.  No flowsheet data found.    Psycho-Social/Depression - PSC-17 required for C&TC through age 18  General screening:  No screening tool used  Teen Screen    Teen Screen not completed: Declined         Objective     Exam  /65   Pulse 78   Temp 98  F (36.7  C) (Oral)   Resp 20   Ht 1.659 m (5' 5.3\")   Wt 59.4 kg (131 lb)   SpO2 99%   BMI 21.60 kg/m    Facility age limit for growth percentiles is 20 years.  Facility age limit for growth percentiles is 20 years.  Facility age limit for growth percentiles is 20 years.  Growth percentile SmartLinks can only be used for patients less than 20 years old.    Physical Exam  GENERAL: Active, alert, in no acute distress.  SKIN: Clear. No significant rash, abnormal pigmentation or lesions. Has scattered acne comedones on face, chest, back with some underlying erythema   HEAD: Normocephalic  EYES: Pupils equal, round, reactive, Extraocular muscles intact. Normal conjunctivae.  EARS: Normal canals. Tympanic membranes are normal; gray and translucent.  NOSE: Normal without discharge.  MOUTH/THROAT: Clear. No oral lesions. Teeth without obvious abnormalities.  NECK: Supple, no masses.  No thyromegaly.  LYMPH NODES: No adenopathy  LUNGS: Clear. No rales, rhonchi, wheezing or retractions  HEART: Regular rhythm. Normal S1/S2. No murmurs. Normal " pulses.  ABDOMEN: Soft, non-tender, not distended, no masses or hepatosplenomegaly. Bowel sounds normal.   NEUROLOGIC: No focal findings. Cranial nerves grossly intact: DTR's normal. Normal gait, strength and tone  BACK: Spine is straight, no scoliosis.  EXTREMITIES: Full range of motion, no deformities  : Exam declined by parent/patient. Reason for decline: Patient/Parental preference     No Marfan stigmata: kyphoscoliosis, high-arched palate, pectus excavatuM, arachnodactyly, arm span > height, hyperlaxity, myopia, MVP, aortic insufficieny)  Eyes: normal fundoscopic and pupils  Cardiovascular: normal PMI, simultaneous femoral/radial pulses, no murmurs (standing, supine, Valsalva)  Skin: no HSV, MRSA, tinea corporis  Musculoskeletal    Neck: normal    Back: normal    Shoulder/arm: normal    Elbow/forearm: normal    Wrist/hand/fingers: normal    Hip/thigh: normal    Knee: normal    Leg/ankle: normal    Foot/toes: normal    Functional (Single Leg Hop or Squat): normal      Screening Questionnaire for Adult Immunization   Are you sick today? No  Do you have allergies to medications, food, a vaccine component or latex? No  Have you ever had a serious reaction after receiving a vaccination? No  Do you have a long-term health problem with heart, lung, kidney, metabolic disease (e.g. diabetes)disease, asthma,a blood disorder, no spleen, complement component deficiency, a cochlear implant, or a spinal fluid leak?  Are you on long-term aspirin therapy? No  Do you, or does a close family member, have cancer, leukemia, HIV/AIDS, or any other immune system problem? No  In the past 3 months, have you taken medications that affect your immune system, such as cortisone, prednisone, other steroids, or anticancer drugs; drugs for the teatment of rheumatoid arthritis, Crohn's disease, pr psoriasis;  or have you had radiation treatments? No  Have you had a seizure, or a brain or other nervous system problem? No  During the past  year, have you received a transfusion of blood or blood products, or been given immune (gamma) globulin or antiviral drug? No  For women: Are you pregnant or is there a chance you could become pregnant during the next month? No  Have you received any vaccinations in the past 4 weeks? No    Immunization questionnaire answers were all negative.           Screening performed by Polina Briones MD on 3/9/2023 at 10:01 AM.    Polina Briones MD  [facility]

## 2023-03-09 NOTE — LETTER
[facility] [address] [place] [address]  Phone: [phone]  Fax: [phone]    March 9, 2023        Ranjit Mojica  [address] [place] [address]          To whom it may concern:    RE: Ranjit Mojica was seen today for a medical appointment. Due to a medical condition, he should not be required to work over time until further notice.     Please contact me for questions or concerns.      Sincerely,        Polina Briones MD

## 2023-03-13 LAB — H PYLORI AG STL QL IA: POSITIVE

## 2023-03-13 RX ORDER — OMEPRAZOLE 40 MG/1
40 CAPSULE, DELAYED RELEASE ORAL 2 TIMES DAILY
Qty: 28 CAPSULE | Refills: 0 | Status: SHIPPED | OUTPATIENT
Start: 2023-03-13 | End: 2023-03-27

## 2023-03-13 RX ORDER — LEVOFLOXACIN 500 MG/1
500 TABLET, FILM COATED ORAL DAILY
Qty: 14 TABLET | Refills: 0 | Status: SHIPPED | OUTPATIENT
Start: 2023-03-13 | End: 2023-03-27

## 2023-03-13 RX ORDER — AMOXICILLIN 250 MG
750 TABLET,CHEWABLE ORAL 3 TIMES DAILY
Qty: 126 TABLET | Refills: 0 | Status: SHIPPED | OUTPATIENT
Start: 2023-03-13 | End: 2023-03-27

## 2023-03-13 NOTE — RESULT ENCOUNTER NOTE
Eugenia Garza,    Could you call this patient with their results:      Dear Ranjit Mojica,    Your stool test was still positive for H pylori despite the previous treatment several months ago. I have prescribed you 3 new medications to take for a 14 day course to fight this infection. Levofloxacin 500mg daily; amoxicillin 750mg three times daily (the pills are 250mg, so you will need to 3 pills three times a day = total 9 per day), and omeprazole 40mg twice daily. You will need to have another stool test 4 weeks after you complete this course of medications. I will order this test for you during your follow up with me on 4/19/23.    It is a lot of pills and you may have GI side effects like upset stomach or looser stool, but is important that you complete this course of medications as prescribed. Skipping doses or stopping early can lead to failed treatment or antibiotic resistance. Please call the clinic if you have fever, bloody stools, severe diarrhea, or any intolerable side effects.     Best regards,    Khoa Mcelroy MD

## 2023-03-20 ENCOUNTER — DOCUMENTATION ONLY (OUTPATIENT)
Dept: FAMILY MEDICINE | Facility: CLINIC | Age: 18
End: 2023-03-20
Payer: COMMERCIAL

## 2023-03-20 DIAGNOSIS — A04.8 H. PYLORI INFECTION: Primary | ICD-10-CM

## 2023-03-20 RX ORDER — AMOXICILLIN 400 MG/5ML
750 POWDER, FOR SUSPENSION ORAL 3 TIMES DAILY
Qty: 393.96 ML | Refills: 0 | Status: SHIPPED | OUTPATIENT
Start: 2023-03-20 | End: 2023-04-03

## 2023-03-20 NOTE — PROGRESS NOTES
Drug is on backorder.  Can you sen in a  Script for the suspension?  Thanks.  Allyn OCONNOR.    Joce Rm MA

## 2023-03-20 NOTE — PROGRESS NOTES
Amoxicillin 250mg tablets on back order, so pharmacist requested change to suspension formulary. E-prescription sent.    Khoa Mcelroy MD PGY-3  [facility]

## 2023-03-24 ENCOUNTER — OFFICE VISIT (OUTPATIENT)
Dept: FAMILY MEDICINE | Facility: CLINIC | Age: 18
End: 2023-03-24
Payer: COMMERCIAL

## 2023-03-24 VITALS
BODY MASS INDEX: 21.63 KG/M2 | OXYGEN SATURATION: 99 % | DIASTOLIC BLOOD PRESSURE: 69 MMHG | RESPIRATION RATE: 20 BRPM | SYSTOLIC BLOOD PRESSURE: 109 MMHG | TEMPERATURE: 97.4 F | HEART RATE: 63 BPM | WEIGHT: 131.2 LBS

## 2023-03-24 DIAGNOSIS — R29.898 BILATERAL LEG WEAKNESS: Primary | ICD-10-CM

## 2023-03-24 PROCEDURE — 99215 OFFICE O/P EST HI 40 MIN: CPT | Performed by: NURSE PRACTITIONER

## 2023-03-24 ASSESSMENT — ENCOUNTER SYMPTOMS
VOMITING: 0
CHILLS: 0
MYALGIAS: 1
DIARRHEA: 0
ARTHRALGIAS: 0
FEVER: 0

## 2023-03-25 NOTE — PATIENT INSTRUCTIONS
Recommend further evaluation in the emergency room.    Your level of weakness would not be expected based simply on Ramadan/fasting.      If you would like to try to eat and see if your weakness improves, that is okay, but if it does not, I would go to the emergency room.    If you want something for pain, you can purchase over-the-counter Tylenol or ibuprofen.

## 2023-03-25 NOTE — PROGRESS NOTES
Assessment & Plan     Bilateral leg weakness         Patient who is fasting on Ramadan with bilateral leg weakness starting today at 3 PM.  No specific injury.  Says that leg is a little painful on the left side with certain movements with strange sensation that he initially described as numbness, but then says he does not have any difficulty feeling his legs.    Has somewhat of a stumbling gait and looks like it is difficult for him to control the left leg in particular.  Has a little bit of pain with bearing weight on the left.  Does have difficulty holding legs up against resistance on both sides.     Could simply be due to weakness from fasting.  Differential includes Guillain-Barré, nerve damage or nerve injury, electrolyte imbalance.     Recommended emergency room the level of weakness, but given option of trying to eat and see if symptoms improve.  Explained they could also give him food in the ER and also evaluate the weakness at the same time.  May use Tylenol or ibuprofen over-the-counter for discomfort.     Patient says he just wanted a work note and something for pain.  Explained that this is not appropriate and that I cannot give him work excuse with incomplete evaluation of his leg weakness.  Patient and brother will talk about it and decide what to do.  40 minutes spent on the date of the encounter doing chart review, patient visit, documentation and discussion with family         No follow-ups on file.    Violet Nevarez, [facility] KENDELL Church is a 18 year old male who presents to clinic today for the following health issues:  Chief Complaint   Patient presents with     Headache     Numbing sensation both legs from hip to feet x 3 PM today. Headaches, no dizziness or lightheaded. A little nausea.      HPI    Headache x 2 days with bilateral leg pain with leg weakness.  Thinks he felt weak starting at the hips down.  Denies numbness and weakness starting  from the feet up denies any injury or hip pain.  Denies low back pain.  Denies numb sensation or difficulty feeling legs.  Has a difficult time describing the sensation in his legs.  Comes and goes.      Brother is intepreting.  - Afghani language.      Had had baclofen for neck pain and is out.      Works in a laundry.  Not heavy lifting.  Had to leave early due to weakness.    Is on Ramadan and is fasting during the day.  Supposed to break his fast around now.    Sensation started 3 PM today.  Feels like he cannot walk normally.  Lightheaded.    Not take any medications.  Has no history of injuries nor chronic leg issues.      Review of Systems   Constitutional: Negative for chills and fever.   Gastrointestinal: Negative for diarrhea and vomiting.   Musculoskeletal: Positive for gait problem and myalgias. Negative for arthralgias.           Objective    /69 (BP Location: Right arm, Patient Position: Sitting, Cuff Size: Adult Regular)   Pulse 63   Temp 97.4  F (36.3  C) (Oral)   Resp 20   Wt 59.5 kg (131 lb 3.2 oz)   SpO2 99%   BMI 21.63 kg/m    Physical Exam  Constitutional:       Appearance: Normal appearance.   Cardiovascular:      Pulses: Normal pulses.   Pulmonary:      Effort: Pulmonary effort is normal.   Neurological:      Mental Status: He is alert.      Motor: Weakness (Able to do a straight leg raise on both sides himself but is not able to keep leg up against any resistance.  Able to easily push down based on age) present.      Gait: Gait abnormal (Stumbling gait, has to hold onto objects.  Is able to walk unassisted at times.  Is able to stand on 1 leg for 15 seconds on both sides.).      Deep Tendon Reflexes:      Reflex Scores:       Patellar reflexes are 2+ on the right side.  Psychiatric:         Mood and Affect: Mood normal.

## 2023-04-12 ENCOUNTER — OFFICE VISIT (OUTPATIENT)
Dept: FAMILY MEDICINE | Facility: CLINIC | Age: 18
End: 2023-04-12
Payer: COMMERCIAL

## 2023-04-12 VITALS
SYSTOLIC BLOOD PRESSURE: 119 MMHG | HEIGHT: 66 IN | DIASTOLIC BLOOD PRESSURE: 76 MMHG | OXYGEN SATURATION: 96 % | WEIGHT: 130 LBS | BODY MASS INDEX: 20.89 KG/M2 | RESPIRATION RATE: 12 BRPM | TEMPERATURE: 98.1 F | HEART RATE: 69 BPM

## 2023-04-12 DIAGNOSIS — M62.81 GENERALIZED MUSCLE WEAKNESS: ICD-10-CM

## 2023-04-12 DIAGNOSIS — M79.606 PAIN OF LOWER EXTREMITY, UNSPECIFIED LATERALITY: ICD-10-CM

## 2023-04-12 DIAGNOSIS — A04.8 H. PYLORI INFECTION: Primary | ICD-10-CM

## 2023-04-12 DIAGNOSIS — M79.601 PAIN OF RIGHT UPPER EXTREMITY: ICD-10-CM

## 2023-04-12 PROCEDURE — 99214 OFFICE O/P EST MOD 30 MIN: CPT | Mod: GC

## 2023-04-12 RX ORDER — ACETAMINOPHEN 500 MG
500-1000 TABLET ORAL EVERY 6 HOURS PRN
Qty: 90 TABLET | Refills: 3 | Status: SHIPPED | OUTPATIENT
Start: 2023-04-12 | End: 2023-11-01

## 2023-04-12 RX ORDER — LEVOFLOXACIN 500 MG/1
500 TABLET, FILM COATED ORAL DAILY
Qty: 14 TABLET | Refills: 0 | Status: SHIPPED | OUTPATIENT
Start: 2023-04-12 | End: 2023-04-26

## 2023-04-12 RX ORDER — OMEPRAZOLE 40 MG/1
40 CAPSULE, DELAYED RELEASE ORAL 2 TIMES DAILY
Qty: 28 CAPSULE | Refills: 0 | Status: SHIPPED | OUTPATIENT
Start: 2023-04-12 | End: 2023-11-01

## 2023-04-12 RX ORDER — AMOXICILLIN 250 MG/1
750 CAPSULE ORAL 3 TIMES DAILY
Qty: 126 CAPSULE | Refills: 0 | Status: SHIPPED | OUTPATIENT
Start: 2023-04-12 | End: 2023-04-26

## 2023-04-12 RX ORDER — IBUPROFEN 400 MG/1
400 TABLET, FILM COATED ORAL EVERY 6 HOURS PRN
Qty: 60 TABLET | Refills: 1 | Status: SHIPPED | OUTPATIENT
Start: 2023-04-12 | End: 2023-04-19

## 2023-04-12 NOTE — PROGRESS NOTES
Assessment & Plan   18 year old male presenting for follow up H pylori as well as weakness and extremity numbness.     H. pylori infection  Failed treatment with quadruple therapy, last tested positive on 3/9/23. Unfortunately, no new medications were ever available at his pharmacy. Will resend today. Will need LG 4 weeks after completion of therapy.   - levofloxacin (LEVAQUIN) 500 MG tablet  Dispense: 14 tablet; Refill: 0  - omeprazole (PRILOSEC) 40 MG DR capsule  Dispense: 28 capsule; Refill: 0  - amoxicillin (AMOXIL) 250 MG capsule  Dispense: 126 capsule; Refill: 0    Pain of right upper extremity  Pain of lower extremity, unspecified laterality  Generalized muscle weakness  Vague ongoing weakness, numbness, and pain in extremities that does not follow any dermatomal patterns. Right upper extremity weakness > left. Patient is right handed. Positive Spurling test on right, otherwise non focal exam. Was previously given referral for EMG, but has not been able to schedule. Pain responded well to OTC pain relievers in the past. No bowel/bladder incontinence. No saddle anesthesia.   - ibuprofen (ADVIL/MOTRIN) 400 MG tablet  Dispense: 60 tablet; Refill: 1  - acetaminophen (TYLENOL) 500 MG tablet  Dispense: 90 tablet; Refill: 3  - Appreciate referral coordinator assistance in scheduling EMG  - Follow up in 2-4 weeks      Return in about 4 weeks (around 5/10/2023) for Follow up pain, weakness.    Lyly Mena DO PGY1  [facility]    Carlos Eduardo Church is a 18 year old, presenting for the following health issues:  Numbness (Legs and foots, unable to move due to numbness), Abdominal Pain (X3 day, as soon as eating feel bloated with stomach aches ), Medication Problem (Patient state only get one script fill, not all his script is fill since last time he is seem, H.pylori med), and Medication Reconciliation (Needs provider attention)        4/12/2023    10:46 AM   Additional Questions  "  Roomed by Mimi   Accompanied by patient(self)     LEAH Church is a 18 year old male with PMH H pylori infection who presents for follow up H pylori results and numbness in his legs.     He does report completing treatment with quadruple therapy, with subsequent H pylori test being positive. He was called and told about his results. However, when he went to the pharmacy, there were no new medications for him to . He continues to have epigastric pain and early satiety.     Reports several months of intermittent numbness and weakness in his legs. At the start of Ramadan, he went to another clinic, and patient was told to go to the emergency room, but he did not want to go to the ED because he did not feel he was having a true emergency, so he went home. Currently, is having weakness in his right arm and hand. Feels very weak in his lower extremities, making it difficult to drive or ambulate. No falls. Also having pain in his extremities. Locations are non specific and are present in all of his extremities. No inciting event or triggers. Did try an OTC pain reliever, which did provide some relief. No bowel or bladder incontinence, no saddle anesthesia.       Review of Systems   Constitutional, HEENT, cardiovascular, pulmonary, gi and gu systems are negative, except as otherwise noted.      Objective    /76 (BP Location: Right arm, Patient Position: Sitting, Cuff Size: Adult Regular)   Pulse 69   Temp 98.1  F (36.7  C) (Oral)   Resp 12   Ht 1.683 m (5' 6.25\")   Wt 59 kg (130 lb)   SpO2 96%   BMI 20.82 kg/m    Body mass index is 20.82 kg/m .  Physical Exam   GENERAL: healthy, alert   RESP: lungs clear to auscultation - no rales, rhonchi or wheezes  CV: regular rate and rhythm, normal S1 S2, no S3 or S4, no murmur, click or rub, no peripheral edema and peripheral pulses strong  ABDOMEN: soft, mild tenderness in epigastrium, no hepatosplenomegaly, no masses and bowel sounds normal  MS: 4/5 strength " right upper extremity, 5/5 strength left upper extremity, bilateral lower extremities, normal gait, positive Spurling's on right, negative straight leg raise bilaterally, patellar reflexes 2/4. Normal sensation.     ----- Service Performed and Documented by Resident or Fellow ------

## 2023-04-18 ENCOUNTER — VIRTUAL VISIT (OUTPATIENT)
Dept: PSYCHOLOGY | Facility: CLINIC | Age: 18
End: 2023-04-18
Attending: STUDENT IN AN ORGANIZED HEALTH CARE EDUCATION/TRAINING PROGRAM
Payer: COMMERCIAL

## 2023-04-18 DIAGNOSIS — Z53.9 NO SHOW: Primary | ICD-10-CM

## 2023-04-18 NOTE — PROGRESS NOTES
Contacted Ranjit Mojica regarding today's appointment.  Invites were sent, he was called and virtual also was attempted 4/18/2023. Interpretor services assisted with leaving message to contact scheduling if he wishes to reschedule counseling services.             Khoa Jaimes,    Your patient was scheduled for a diagnostic evaluation with [facility] today.  I wanted to make sure you were aware that they did not show for this appointment. Interpretor services assisted with leaving message to contact scheduling if he wishes to reschedule counseling services.       Please do not hesitate to contact me with any questions.      Alberto Figueredo, LMFT  April 18, 2023  [facility]

## 2023-04-19 ENCOUNTER — OFFICE VISIT (OUTPATIENT)
Dept: FAMILY MEDICINE | Facility: CLINIC | Age: 18
End: 2023-04-19
Payer: COMMERCIAL

## 2023-04-19 VITALS
HEART RATE: 70 BPM | DIASTOLIC BLOOD PRESSURE: 69 MMHG | BODY MASS INDEX: 21.05 KG/M2 | HEIGHT: 66 IN | RESPIRATION RATE: 12 BRPM | OXYGEN SATURATION: 98 % | TEMPERATURE: 98.1 F | SYSTOLIC BLOOD PRESSURE: 108 MMHG | WEIGHT: 131 LBS

## 2023-04-19 DIAGNOSIS — F41.1 GAD (GENERALIZED ANXIETY DISORDER): ICD-10-CM

## 2023-04-19 DIAGNOSIS — R14.0 ABDOMINAL BLOATING: ICD-10-CM

## 2023-04-19 DIAGNOSIS — F33.9 EPISODE OF RECURRENT MAJOR DEPRESSIVE DISORDER, UNSPECIFIED DEPRESSION EPISODE SEVERITY (H): Primary | ICD-10-CM

## 2023-04-19 PROCEDURE — 90471 IMMUNIZATION ADMIN: CPT | Performed by: STUDENT IN AN ORGANIZED HEALTH CARE EDUCATION/TRAINING PROGRAM

## 2023-04-19 PROCEDURE — 90651 9VHPV VACCINE 2/3 DOSE IM: CPT | Performed by: STUDENT IN AN ORGANIZED HEALTH CARE EDUCATION/TRAINING PROGRAM

## 2023-04-19 PROCEDURE — 99214 OFFICE O/P EST MOD 30 MIN: CPT | Mod: 25 | Performed by: STUDENT IN AN ORGANIZED HEALTH CARE EDUCATION/TRAINING PROGRAM

## 2023-04-19 RX ORDER — ESCITALOPRAM OXALATE 20 MG/1
20 TABLET ORAL DAILY
Qty: 90 TABLET | Refills: 1 | Status: SHIPPED | OUTPATIENT
Start: 2023-04-19 | End: 2023-04-19

## 2023-04-19 RX ORDER — SIMETHICONE 80 MG
80 TABLET,CHEWABLE ORAL EVERY 6 HOURS PRN
Qty: 30 TABLET | Refills: 0 | Status: SHIPPED | OUTPATIENT
Start: 2023-04-19 | End: 2023-11-01

## 2023-04-19 RX ORDER — ESCITALOPRAM OXALATE 20 MG/1
20 TABLET ORAL DAILY
Qty: 90 TABLET | Refills: 1 | Status: SHIPPED | OUTPATIENT
Start: 2023-04-19 | End: 2023-11-01

## 2023-04-19 ASSESSMENT — ANXIETY QUESTIONNAIRES
GAD7 TOTAL SCORE: 2
3. WORRYING TOO MUCH ABOUT DIFFERENT THINGS: SEVERAL DAYS
GAD7 TOTAL SCORE: 2
2. NOT BEING ABLE TO STOP OR CONTROL WORRYING: SEVERAL DAYS
IF YOU CHECKED OFF ANY PROBLEMS ON THIS QUESTIONNAIRE, HOW DIFFICULT HAVE THESE PROBLEMS MADE IT FOR YOU TO DO YOUR WORK, TAKE CARE OF THINGS AT HOME, OR GET ALONG WITH OTHER PEOPLE: NOT DIFFICULT AT ALL
7. FEELING AFRAID AS IF SOMETHING AWFUL MIGHT HAPPEN: NOT AT ALL
1. FEELING NERVOUS, ANXIOUS, OR ON EDGE: NOT AT ALL
5. BEING SO RESTLESS THAT IT IS HARD TO SIT STILL: NOT AT ALL
6. BECOMING EASILY ANNOYED OR IRRITABLE: NOT AT ALL

## 2023-04-19 ASSESSMENT — PATIENT HEALTH QUESTIONNAIRE - PHQ9
SUM OF ALL RESPONSES TO PHQ QUESTIONS 1-9: 7
5. POOR APPETITE OR OVEREATING: NOT AT ALL

## 2023-04-19 NOTE — PROGRESS NOTES
Preceptor Attestation:    I discussed the patient with the resident and evaluated the patient in person. I have verified the content of the note, which accurately reflects my assessment of the patient and the plan of care.   Supervising Physician:  Inderjit Keenan DO.

## 2023-04-19 NOTE — PROGRESS NOTES
Assessment and Plan      Ranjit was seen today for depression, medication reconciliation and other.    Diagnoses and all orders for this visit:    Episode of recurrent major depressive disorder, unspecified depression episode severity (H)  MILDRED (generalized anxiety disorder)  Patient reports that he has had profound improvement in his depression and anxiety after initiation of escitalopram without any notable side effects.  No reported major life events, but still feels worried regarding safety of family in [facility] and request having assistance having them again status in the [place] for their safety.  Legal referral placed.  -     Discontinue: escitalopram (LEXAPRO) 20 MG tablet; Take 1 tablet (20 mg) by mouth daily Take 10 mg (1 tablet) daily for 1 week and then increase to 20 mg daily (2 tablets)  -     Legal Services Referral - [place] only; Future  -     escitalopram (LEXAPRO) 20 MG tablet; Take 1 tablet (20 mg) by mouth daily      Abdominal bloating  Patient reports postprandial abdominal bloating without red flag symptoms.  No improvement with triple therapy for H. pylori.  Will trial simethicone as needed.  Encouraged following up in 5 weeks for H. pylori retesting.  -     simethicone (MYLICON) 80 MG chewable tablet; Take 1 tablet (80 mg) by mouth every 6 hours as needed for flatulence or cramping      Patient reported some dandruff that was not present on exam.  Encouraged him to trial head and shoulder shampoo.    Other orders  Dose 2 of 3 of HPV.  -     HPV9 (GARDASIL 9)       Diagnosis or treatment significantly limited by social determinants of health - [place]  used  Prescription drug management    Options for treatment and follow-up care were reviewed with the patient. Patient engaged in the decision making process and verbalized understanding of the options discussed and agreed with the final plan.    Patient was staffed with attending physician Dr. Keenan.    Khoa Mcelroy MD PGY-3            HPI       Ranjit Mojica is a 18 year old year old male w/ PMH of   Patient Active Problem List   Diagnosis     Encounter for health examination of refugee     H. pylori infection    who presents for   Chief Complaint   Patient presents with     Depression     Medication Reconciliation     Provider to review, did not review     Other     Eating issue not improving, only able to eat tiny amount       Feeling bloated with eating the past 2 weeks. Has been taking levofloxacin, amoxicillin, and omeprazole over the past several days for H pylori that was not cured with earlier treatment; feels that the current antibiotic course resolved his epigastric abdominal burning pain but has not made any improvement and the bloating sensation.    Depression and Anxiety Follow-Up    How are you doing with your depression since your last visit? Improved    How are you doing with your anxiety since your last visit?  Improved    Are you having other symptoms that might be associated with depression or anxiety? No    Have you had a significant life event? Health Concerns     Do you have any concerns with your use of alcohol or other drugs? No    Social History     Tobacco Use     Smoking status: Never     Smokeless tobacco: Never   Vaping Use     Vaping status: Never Used   Substance Use Topics     Alcohol use: Never     Drug use: Never         2/14/2023    10:09 AM 4/19/2023     1:26 PM   PHQ   PHQ-9 Total Score 21 7   Q9: Thoughts of better off dead/self-harm past 2 weeks Not at all Not at all         2/14/2023    10:09 AM 4/19/2023     1:29 PM   MILDRED-7 SCORE   Total Score 19 2         4/19/2023     1:26 PM   Last PHQ-9   1.  Little interest or pleasure in doing things 0   2.  Feeling down, depressed, or hopeless 1   3.  Trouble falling or staying asleep, or sleeping too much 0   4.  Feeling tired or having little energy 0   5.  Poor appetite or overeating 3   6.  Feeling bad about yourself 0   7.  Trouble concentrating 3   8.   "Moving slowly or restless 0   Q9: Thoughts of better off dead/self-harm past 2 weeks 0   PHQ-9 Total Score 7   Difficulty at work, home, or with people Not difficult at all         4/19/2023     1:29 PM   MILDRED-7    1. Feeling nervous, anxious, or on edge 0   2. Not being able to stop or control worrying 1   3. Worrying too much about different things 1   4. Trouble relaxing 0   5. Being so restless that it is hard to sit still 0   6. Becoming easily annoyed or irritable 0   7. Feeling afraid, as if something awful might happen 0   MILDRED-7 Total Score 2   If you checked any problems, how difficult have they made it for you to do your work, take care of things at home, or get along with other people? Not difficult at all       A [employer]  was used for  this visit.           Review of Systems:   8 point ROS negative other than as specified above.         Physical Exam:   /69 (BP Location: Left arm, Patient Position: Sitting, Cuff Size: Adult Regular)   Pulse 70   Temp 98.1  F (36.7  C) (Oral)   Resp 12   Ht 1.676 m (5' 6\")   Wt 59.4 kg (131 lb)   SpO2 98%   BMI 21.14 kg/m      Vitals were reviewed and were normal  Wt Readings from Last 4 Encounters:   04/19/23 59.4 kg (131 lb)   04/12/23 59 kg (130 lb)   03/24/23 59.5 kg (131 lb 3.2 oz)   03/09/23 59.4 kg (131 lb)        Exam:  Constitutional: healthy, alert, no distress, and cooperative  Head: normocephalic  Cardiovascular: appears well perfused  Respiratory: breathing comfortably on RA  Abdomen: soft, non-tender, non-distended; no CVA tenderness  Musculoskeletal: extremities normal- no gross deformities noted, gait normal  Skin: no suspicious lesions or rashes on exposed skin  Neurologic: grossly normal CN  Psychiatric: mentation appears normal and affect normal       Results:   No testing ordered today    "

## 2023-04-19 NOTE — LETTER
RETURN TO WORK/SCHOOL FORM    4/19/2023    Re: Ranjit Mojica  2005      To Whom It May Concern:     Ranjit Mojica was seen in clinic today..  He may return to school without restrictions on 4/20/23          Restrictions:  Nonefull duty      Khoa Mcelroy MD  4/19/2023 2:22 PM

## 2023-04-19 NOTE — NURSING NOTE
Prior to immunization administration, verified patients identity using patient s name and date of birth. Please see Immunization Activity for additional information.     Screening Questionnaire for Adult Immunization    Are you sick today?   No   Do you have allergies to medications, food, a vaccine component or latex?   No   Have you ever had a serious reaction after receiving a vaccination?   No   Do you have a long-term health problem with heart, lung, kidney, or metabolic disease (e.g., diabetes), asthma, a blood disorder, no spleen, complement component deficiency, a cochlear implant, or a spinal fluid leak?  Are you on long-term aspirin therapy?   No   Do you have cancer, leukemia, HIV/AIDS, or any other immune system problem?   No   Do you have a parent, brother, or sister with an immune system problem?   No   In the past 3 months, have you taken medications that affect  your immune system, such as prednisone, other steroids, or anticancer drugs; drugs for the treatment of rheumatoid arthritis, Crohn s disease, or psoriasis; or have you had radiation treatments?   No   Have you had a seizure, or a brain or other nervous system problem?   Don't Know   During the past year, have you received a transfusion of blood or blood    products, or been given immune (gamma) globulin or antiviral drug?   No   For women: Are you pregnant or is there a chance you could become       pregnant during the next month?   No   Have you received any vaccinations in the past 4 weeks?   No     Immunization questionnaire answers were all negative.      Injection of HPV9 given by Mimi Rm CMA. Patient instructed to remain in clinic for 15 minutes afterwards, and to report any adverse reactions.     Screening performed by Mimi Rm CMA on 4/19/2023 at 2:24 PM.

## 2023-04-19 NOTE — PATIENT INSTRUCTIONS
Head and shoulder shampoo for dandruff                            April 20, 2023   Legal Services Referral - [facility] only  Legal referral has been sent to [facility].     Scan/Send demographics and referral to [email or web address]    [facility] will review, advise, and contact the patient.     Roseline Rm

## 2023-04-28 DIAGNOSIS — Z53.9 NO SHOW: Primary | ICD-10-CM

## 2023-05-04 ENCOUNTER — OFFICE VISIT (OUTPATIENT)
Dept: FAMILY MEDICINE | Facility: CLINIC | Age: 18
End: 2023-05-04
Payer: COMMERCIAL

## 2023-05-04 VITALS
TEMPERATURE: 98.2 F | DIASTOLIC BLOOD PRESSURE: 65 MMHG | HEIGHT: 66 IN | SYSTOLIC BLOOD PRESSURE: 104 MMHG | BODY MASS INDEX: 21.86 KG/M2 | HEART RATE: 73 BPM | OXYGEN SATURATION: 98 % | RESPIRATION RATE: 20 BRPM | WEIGHT: 136 LBS

## 2023-05-04 DIAGNOSIS — R20.0 BILATERAL LEG NUMBNESS: ICD-10-CM

## 2023-05-04 DIAGNOSIS — R10.9 STOMACH PAIN: ICD-10-CM

## 2023-05-04 DIAGNOSIS — A04.8 H. PYLORI INFECTION: Primary | ICD-10-CM

## 2023-05-04 DIAGNOSIS — M25.512 ACUTE PAIN OF BOTH SHOULDERS: ICD-10-CM

## 2023-05-04 DIAGNOSIS — M25.511 ACUTE PAIN OF BOTH SHOULDERS: ICD-10-CM

## 2023-05-04 PROCEDURE — 99214 OFFICE O/P EST MOD 30 MIN: CPT | Mod: GC

## 2023-05-04 RX ORDER — IBUPROFEN 400 MG/1
400 TABLET, FILM COATED ORAL EVERY 6 HOURS PRN
Qty: 90 TABLET | Refills: 0 | Status: SHIPPED | OUTPATIENT
Start: 2023-05-04 | End: 2023-05-30

## 2023-05-04 RX ORDER — FAMOTIDINE 20 MG/1
20 TABLET, FILM COATED ORAL 2 TIMES DAILY
Qty: 90 TABLET | Refills: 1 | Status: SHIPPED | OUTPATIENT
Start: 2023-05-04 | End: 2023-11-01

## 2023-05-04 RX ORDER — DICYCLOMINE HYDROCHLORIDE 10 MG/1
10 CAPSULE ORAL 4 TIMES DAILY PRN
Qty: 40 CAPSULE | Refills: 0 | Status: SHIPPED | OUTPATIENT
Start: 2023-05-04 | End: 2023-11-01

## 2023-05-04 NOTE — PROGRESS NOTES
Assessment & Plan     H. pylori infection  Patient has received quadruple therapy for H. pylori.  Still on omeprazole.  Will need to be off this for 2 weeks in order to have a proper test of cure for H. pylori.  Future lab has been installed for pickup at a later time.  - Helicobacter pylori Antigen Stool; Future  - famotidine (PEPCID) 20 MG tablet; Take 1 tablet (20 mg) by mouth 2 times daily    Stomach pain  Patient reporting stomach pain today in the epigastrium, suprapubic, and left lower quadrant.  Cramping in quality, worse the past 2 days off his baseline stomach pain from the H. pylori which has been improving with the medication has been on. (Omeprazole, simethicone).  Added short course dose of dicyclomine and famotidine to deal with recent increase in stomach pain, with goal to stay off omeprazole for 2 weeks we can have a test of cure for the above H. pylori.  - dicyclomine (BENTYL) 10 MG capsule; Take 1 capsule (10 mg) by mouth 4 times daily as needed (Stomach cramps)    Acute pain of both shoulders  Patient had pain in both shoulders starting 2 days ago.  Marked pain with internal and external rotation.  Point tenderness of the acromion.  Slight numbness in the area.  No outright weakness in the rotator cuff.  Patient does not describe any acute change or lifting activity, but likely is the source of the pain.  - ibuprofen (ADVIL/MOTRIN) 400 MG tablet; Take 1 tablet (400 mg) by mouth every 6 hours as needed for moderate pain (Shoulder pain)  -Provided sample tube of Voltaren at the office today.  -Attached shoulder stretches to prevent soreness in the future.    Bilateral leg numbness, resolved  Patient previously seen with us for bilateral leg numbness and gait disturbance during Ramadan.  Was previously scheduled for an EMG but has not been able to go and do it.  This has resolved at this time.  Do not see any need to have EMG rescheduled at this time.  But we will keep the referral on board in  "case this episode recurs again.    Diagnosis or treatment significantly limited by social determinants of health - [place]-speaking, refugee  Ordering of each unique test  Prescription drug management  45 minutes spent by me on the date of the encounter doing chart review, history and exam, documentation and further activities per the note       MEDICATIONS:   Orders Placed This Encounter   Medications     famotidine (PEPCID) 20 MG tablet     Sig: Take 1 tablet (20 mg) by mouth 2 times daily     Dispense:  90 tablet     Refill:  1     dicyclomine (BENTYL) 10 MG capsule     Sig: Take 1 capsule (10 mg) by mouth 4 times daily as needed (Stomach cramps)     Dispense:  40 capsule     Refill:  0     ibuprofen (ADVIL/MOTRIN) 400 MG tablet     Sig: Take 1 tablet (400 mg) by mouth every 6 hours as needed for moderate pain (Shoulder pain)     Dispense:  90 tablet     Refill:  0          - Continue other medications without change    Return in about 2 weeks (around 5/18/2023) for For H. pylori test of cure..    Mary Bishop MD  PGY-2 Family Medicine Resident  [facility]    Carlos Eduardo Church is a 18 year old, presenting for the following health issues:  Other (Shoulder and stomach pain for a while now.  Today shoulder pain is getting worse.)        4/19/2023     1:22 PM   Additional Questions   Roomed by Mimi   Accompanied by patient(self)     HPI   Stomach pain and leg numbness. Previously treated for H. Pylori. Still on omeprazole, and simethicone. Seen for bilateral leg weakness 3/24 at [facility] for acute onset leg numbness and gait disturbance. Was advised to go to [facility] but did not. Was seen at our clinic 4/12, was given EMG referral but has not been able to coordinate appointment yet.     Today, complaining of stomach and shoulder pain. Abdominal pain today is worse past few days. \"Feels like someone twisting my intestine\", no connection to defecating and eating. Pain decreased some, but " "worse the past 2 day.  Has plenty of stomach medication at home, but has not helped current burst of pain.     New onset shoulder pain about 2 days ago as well. Shoulder feels like someone has hit him in the shoulder, sore, spasm sensation. Both shoulders, hurts in acromion with some freezing/numbnmess, motion of abducting arms and crossing midline worsens pain. If he makes a fist, part of the wrist gets numb. His mom gave him a pain medication at home which helped minimally.     Legs and gait have improved. No concerns about that at this time.     Review of Systems   Constitutional, HEENT, cardiovascular, pulmonary, gi and gu systems are negative, except as otherwise noted.      Objective    /65   Pulse 73   Temp 98.2  F (36.8  C) (Oral)   Resp 20   Ht 1.676 m (5' 6\")   Wt 61.7 kg (136 lb)   SpO2 98%   BMI 21.95 kg/m    Body mass index is 21.95 kg/m .  Physical Exam   GENERAL: healthy, alert and no distress  RESP: lungs clear to auscultation - no rales, rhonchi or wheezes  CV: regular rate and rhythm, normal S1 S2, no S3 or S4, no murmur, click or rub, no peripheral edema and peripheral pulses strong  ABDOMEN: soft, tender to palpation in epigastrum, suprapubic and LLQ (worst at LLQ), no hepatosplenomegaly, no masses and bowel sounds normal  MS: no gross musculoskeletal defects noted, no edema, point tenderness to palpation of bilateral acromion, full AROM but pain with internal and external rotation of shoulders  PSYCH: mentation appears normal, affect normal/bright    Future H pylori test planned    ----- Service Performed and Documented by Resident or Fellow ------              "

## 2023-05-04 NOTE — PATIENT INSTRUCTIONS
Stop your omeprazole for 14 days before we test you to make sure that the H. pylori has cleared.  For now, I will give you some famotidine to try.  See if this helps her lower abdominal pain as well.    Continue taking simethicone.    For your shoulder pain, I will be giving you an ointment to apply as well as a pill to take for pain.  In addition to this, I will give you some stretches to try.    Come back in 2 weeks and we will recheck both your shoulder and your stomach pain.

## 2023-05-04 NOTE — Clinical Note
Patient seeing me right now. Was given referral for an EMG but has had trouble scheduling. Speaks [place]. If you have time, I would like to have him swing by your office before heading home.  Thanks,  Mary Bishop MD

## 2023-05-04 NOTE — ASSESSMENT & PLAN NOTE
Started abruptly 3/24 during Ramadan, accompanied by vague pain, gait disturbance and non-focal neuro findings.

## 2023-05-04 NOTE — LETTER
RETURN TO WORK/SCHOOL FORM    5/4/2023    Re: Ranjit Mojica  2005      To Whom It May Concern:     Ranjit Mojica was seen in clinic today.  He may return to school without restrictions on 5/5/23.  If able, he will also need to be excused early to  his younger brother.         Mary Bishop MD  5/4/2023 1:22 PM

## 2023-05-16 ENCOUNTER — OFFICE VISIT (OUTPATIENT)
Dept: FAMILY MEDICINE | Facility: CLINIC | Age: 18
End: 2023-05-16
Payer: COMMERCIAL

## 2023-05-16 VITALS
BODY MASS INDEX: 22.6 KG/M2 | SYSTOLIC BLOOD PRESSURE: 106 MMHG | OXYGEN SATURATION: 94 % | RESPIRATION RATE: 16 BRPM | DIASTOLIC BLOOD PRESSURE: 69 MMHG | HEART RATE: 82 BPM | WEIGHT: 140 LBS | TEMPERATURE: 98.3 F

## 2023-05-16 DIAGNOSIS — A04.8 H. PYLORI INFECTION: Primary | ICD-10-CM

## 2023-05-16 PROCEDURE — 99214 OFFICE O/P EST MOD 30 MIN: CPT | Mod: GC | Performed by: STUDENT IN AN ORGANIZED HEALTH CARE EDUCATION/TRAINING PROGRAM

## 2023-05-16 RX ORDER — SUCRALFATE 1 G/1
1 TABLET ORAL 4 TIMES DAILY
Qty: 60 TABLET | Refills: 1 | Status: SHIPPED | OUTPATIENT
Start: 2023-05-16 | End: 2023-11-01

## 2023-05-16 NOTE — PROGRESS NOTES
Assessment & Plan     H. pylori infection  Vital signs are within normal limits, also there is no interval weight loss.  Recommend that we initiate sucralfate as a gastric protectant, also recommended that he stop his PPI and obtain a repeat H. pylori stool antigen test within the next 2 weeks.  At this point I would recommend that he follow-up with GI and pursue an endoscopy given that he has failed treatment with 2 courses of H. pylori treatment, and his increasing dysphagia.  He is agreeable to this plan.  - Adult GI  Referral - Consult Only; Future  - Adult GI  Referral - Procedure Only; Future  - Helicobacter pylori Antigen Stool; Future  - sucralfate (CARAFATE) 1 GM tablet; Take 1 tablet (1 g) by mouth 4 times daily  - Helicobacter pylori Antigen Stool      Return for Follow up gastroenterology, endoscopy.    Tapan New MD  [facility]    Carlos Eduardo Church is a 18 year old, presenting for the following health issues:  Gastrophageal Reflux (Per pt has had this issue with his stomach for a while now- states that his acid reflux has gotten worse the last 3 days- does have a bitter taste. States that he has something stuck in his throat)        5/4/2023    12:39 PM   Additional Questions   Roomed by garrett   Accompanied by self     Patient Active Problem List   Diagnosis     Encounter for health examination of refugee     H. pylori infection     Episode of recurrent major depressive disorder, unspecified depression episode severity (H)     MILDRED (generalized anxiety disorder)     Bilateral leg numbness     Current Outpatient Medications   Medication     dicyclomine (BENTYL) 10 MG capsule     famotidine (PEPCID) 20 MG tablet     ibuprofen (ADVIL/MOTRIN) 400 MG tablet     omeprazole (PRILOSEC) 40 MG DR capsule     sucralfate (CARAFATE) 1 GM tablet     acetaminophen (TYLENOL) 500 MG tablet     escitalopram (LEXAPRO) 20 MG tablet     simethicone (MYLICON) 80 MG chewable  tablet     No current facility-administered medications for this visit.       HPI     Patient is a 18-year-old with recurrent H. pylori gastroenteritis, he presents after completing both bismuth subsalicylate quadruple therapy, clindamycin triple therapy.  Last H. pylori antigen test was +3 months ago.  He states that his symptoms are persistent, and he feels increasing dysphagia difficulty with swallowing.  He denies any nausea, vomiting, bloody stools, weight loss.  He endorses that he completed all of his medications as prescribed.      Review of Systems   Constitutional, HEENT, cardiovascular, pulmonary, gi and gu systems are negative, except as otherwise noted.      Objective    /69   Pulse 82   Temp 98.3  F (36.8  C) (Oral)   Resp 16   Wt 63.5 kg (140 lb)   SpO2 94%   BMI 22.60 kg/m    Body mass index is 22.6 kg/m .  Physical Exam   GENERAL: healthy, alert and no distress  EYES: Eyes grossly normal to inspection, PERRL and conjunctivae and sclerae normal  RESP: lungs clear to auscultation - no rales, rhonchi or wheezes  CV: regular rate and rhythm, normal S1 S2, no S3 or S4, no murmur, click or rub, no peripheral edema and peripheral pulses strong  ABDOMEN: soft, nontender, no hepatosplenomegaly, no masses and bowel sounds normal  MS: no gross musculoskeletal defects noted, no edema  SKIN: no suspicious lesions or rashes  NEURO: Normal strength and tone, mentation intact and speech normal  PSYCH: mentation appears normal, affect normal/bright    No results found for any visits on 05/16/23.    ----- Service Performed and Documented by Resident or Fellow ------

## 2023-05-16 NOTE — LETTER
RETURN TO WORK/SCHOOL FORM    5/16/2023    Re: Ranjit Mojica  2005      To Whom It May Concern:     Ranjit Mojica was seen in clinic today..  He may return to school without restrictions on 5/17/23          Restrictions:  None      Tapan New MD  5/16/2023 9:24 AM

## 2023-05-17 NOTE — PATIENT INSTRUCTIONS
05/17/23   GASTROENTEROLOGY REFERRAL  [facility]  Phone [phone]  Fax: [phone]    Online referral placed with [facility] who will contact patient to schedule.     Roseline Rm

## 2023-05-26 ENCOUNTER — TRANSFERRED RECORDS (OUTPATIENT)
Dept: HEALTH INFORMATION MANAGEMENT | Facility: CLINIC | Age: 18
End: 2023-05-26
Payer: COMMERCIAL

## 2023-05-30 ENCOUNTER — OFFICE VISIT (OUTPATIENT)
Dept: FAMILY MEDICINE | Facility: CLINIC | Age: 18
End: 2023-05-30
Payer: COMMERCIAL

## 2023-05-30 VITALS
DIASTOLIC BLOOD PRESSURE: 73 MMHG | RESPIRATION RATE: 16 BRPM | OXYGEN SATURATION: 99 % | HEIGHT: 60 IN | HEART RATE: 74 BPM | WEIGHT: 137 LBS | BODY MASS INDEX: 26.9 KG/M2 | TEMPERATURE: 97.7 F | SYSTOLIC BLOOD PRESSURE: 111 MMHG

## 2023-05-30 DIAGNOSIS — R10.84 ABDOMINAL PAIN, GENERALIZED: Primary | ICD-10-CM

## 2023-05-30 PROBLEM — R20.0 BILATERAL LEG NUMBNESS: Status: RESOLVED | Noted: 2023-05-04 | Resolved: 2023-05-30

## 2023-05-30 PROCEDURE — 99213 OFFICE O/P EST LOW 20 MIN: CPT | Mod: GC | Performed by: STUDENT IN AN ORGANIZED HEALTH CARE EDUCATION/TRAINING PROGRAM

## 2023-05-30 NOTE — PROGRESS NOTES
Assessment and Plan      Ranjit was seen today for follow up, results and medication reconciliation.    Diagnoses and all orders for this visit:    Abdominal pain, generalized    Patient with history of what appears to be treatment resistant H pylori vs other abdominal pain presenting for follow up after having an EGD recently. Unfortunately we have not have the [facility] results. Discussed it would be fine to use simethicone and sucralfate as needed which were previously prescribed. If he has not heard back from [facility] within a week, he should call clinic.    Diagnosis or treatment significantly limited by social determinants of health - [place]  used    Options for treatment and follow-up care were reviewed with the patient. Patient engaged in the decision making process and verbalized understanding of the options discussed and agreed with the final plan.    Patient was staffed with attending physician Dr. Chandler.    Khoa Mcelroy MD PGY-3           HPI       Ranjit Mojica is a 18 year old year old male w/ PMH of   Patient Active Problem List   Diagnosis     Encounter for health examination of refugee     H. pylori infection     Episode of recurrent major depressive disorder, unspecified depression episode severity (H)     MILDRED (generalized anxiety disorder)     Bilateral leg numbness    who presents for   Chief Complaint   Patient presents with     Follow Up     H. Pylori     Results     GI scan     Medication Reconciliation     Pt report have medication, but inform to wait for result prior to start med, attention provider     Patient had positive H pylori testing on 10/7/22 & 3/9/23; has already completed 2 or 3 courses of treatment including quadruple therapy. Still has some intermittent abdominal pain lasting 5 minutes to an hour a few times a day; pain seems to migrate (currently in RUQ). Had an endoscopy a few days ago and is awaiting the results; not taking any medications at this time.     NI Mullen   was used for  this visit.           Review of Systems:   8 point ROS negative other than as specified above.         Physical Exam:   /73 (BP Location: Left arm, Patient Position: Sitting, Cuff Size: Adult Regular)   Pulse 74   Temp 97.7  F (36.5  C) (Oral)   Resp 16   Ht 1.524 m (5')   Wt 62.1 kg (137 lb)   SpO2 99%   BMI 26.76 kg/m      Vitals were reviewed and were normal  Wt Readings from Last 4 Encounters:   05/30/23 62.1 kg (137 lb)   05/16/23 63.5 kg (140 lb)   05/04/23 61.7 kg (136 lb)   04/19/23 59.4 kg (131 lb)        Exam:  Constitutional: healthy, alert, no distress, and cooperative  Head: normocephalic  Cardiovascular: appears well perfused  Respiratory: breathing comfortably on RA  Abdomen: soft; RUQ tenderness to palpation, non-distended; no CVA tenderness  Musculoskeletal: extremities normal- no gross deformities noted, gait normal  Skin: no suspicious lesions or rashes on exposed skin  Neurologic: grossly normal CN  Psychiatric: mentation appears normal and affect normal       Results:   No testing ordered today

## 2023-05-30 NOTE — LETTER
RETURN TO WORK/SCHOOL FORM    5/30/2023    Re: Ranjit Mojica  2005      To Whom It May Concern:     Ranjit Mojica was seen in clinic today..  He may return to school without restrictions on 5/31/23          Restrictions:  Nonefull duty      Khoa Mcelroy MD  5/30/2023 1:37 PM

## 2023-05-30 NOTE — PROGRESS NOTES
Preceptor Attestation:  Vitals:    05/30/23 1332   BP: 111/73   BP Location: Left arm   Patient Position: Sitting   Cuff Size: Adult Regular   Pulse: 74   Resp: 16   Temp: 97.7  F (36.5  C)   TempSrc: Oral   SpO2: 99%   Weight: 62.1 kg (137 lb)   Height: 1.524 m (5')          I discussed the patient with the resident and evaluated the patient in person. I have verified the content of the note, which accurately reflects my assessment of the patient and the plan of care.   Supervising Physician:  Mary Chandler MD

## 2023-06-06 ENCOUNTER — OFFICE VISIT (OUTPATIENT)
Dept: FAMILY MEDICINE | Facility: CLINIC | Age: 18
End: 2023-06-06
Payer: COMMERCIAL

## 2023-06-06 VITALS
OXYGEN SATURATION: 100 % | WEIGHT: 141.2 LBS | BODY MASS INDEX: 27.58 KG/M2 | RESPIRATION RATE: 18 BRPM | DIASTOLIC BLOOD PRESSURE: 64 MMHG | SYSTOLIC BLOOD PRESSURE: 99 MMHG | TEMPERATURE: 98.1 F | HEART RATE: 69 BPM

## 2023-06-06 DIAGNOSIS — A04.8 H. PYLORI INFECTION: Primary | ICD-10-CM

## 2023-06-06 DIAGNOSIS — A07.1 GIARDIASIS: ICD-10-CM

## 2023-06-06 PROCEDURE — 99214 OFFICE O/P EST MOD 30 MIN: CPT | Mod: GC | Performed by: STUDENT IN AN ORGANIZED HEALTH CARE EDUCATION/TRAINING PROGRAM

## 2023-06-06 RX ORDER — AMOXICILLIN 500 MG/1
1000 CAPSULE ORAL 2 TIMES DAILY
Qty: 56 CAPSULE | Refills: 0 | Status: SHIPPED | OUTPATIENT
Start: 2023-06-06 | End: 2023-06-06

## 2023-06-06 RX ORDER — AMOXICILLIN 500 MG/1
1000 CAPSULE ORAL 2 TIMES DAILY
Qty: 56 CAPSULE | Refills: 0 | Status: SHIPPED | OUTPATIENT
Start: 2023-06-06 | End: 2023-11-01

## 2023-06-06 RX ORDER — CLARITHROMYCIN 500 MG
500 TABLET ORAL 2 TIMES DAILY
Qty: 28 TABLET | Refills: 0 | Status: SHIPPED | OUTPATIENT
Start: 2023-06-06 | End: 2023-06-20

## 2023-06-06 RX ORDER — BISMUTH SUBSALICYLATE 262 MG/1
2 TABLET, CHEWABLE ORAL 3 TIMES DAILY
Qty: 84 TABLET | Refills: 0 | Status: SHIPPED | OUTPATIENT
Start: 2023-06-06 | End: 2023-06-06

## 2023-06-06 RX ORDER — METRONIDAZOLE 500 MG/1
500 TABLET ORAL 3 TIMES DAILY
Qty: 42 TABLET | Refills: 0 | Status: SHIPPED | OUTPATIENT
Start: 2023-06-06 | End: 2023-06-20

## 2023-06-06 RX ORDER — TETRACYCLINE HYDROCHLORIDE 500 MG/1
500 CAPSULE ORAL 4 TIMES DAILY
Qty: 56 CAPSULE | Refills: 0 | Status: SHIPPED | OUTPATIENT
Start: 2023-06-06 | End: 2023-06-06

## 2023-06-06 NOTE — PROGRESS NOTES
Assessment and Plan   Ranjit was seen today for abdominal pain and fatigue.  Multiple history of H. pylori that seem to be resistant to treatment.  Did receive levofloxacin, amoxicillin, and bismuth on 20/22 and 3/23.  No improvement in symptoms. Recent EGD from May/23 positive for H. pylori infection and giardiasis.    Diagnoses and all orders for this visit:    H. pylori infection    -     metroNIDAZOLE (FLAGYL) 500 MG tablet; Take 1 tablet (500 mg) by mouth 3 times daily for 14 days  -     clarithromycin (BIAXIN) 500 MG tablet; Take 1 tablet (500 mg) by mouth 2 times daily for 14 days  -     omeprazole (PRILOSEC) 20 MG DR capsule; Take 1 capsule (20 mg) by mouth 2 times daily for 21 days  -     amoxicillin (AMOXIL) 500 MG capsule; Take 2 capsules (1,000 mg) by mouth 2 times daily    Giardiasis  -     metroNIDAZOLE (FLAGYL) 500 MG tablet; Take 1 tablet (500 mg) by mouth 3 times daily for 14 days             Options for treatment and follow-up care were reviewed with the patient. Patient engaged in the decision making process and verbalized understanding of the options discussed and agreed with the final plan.    Patient was staffed with attending physician MD Ceasar Antony MD PGY3           HPI       Ranjit Mojica is a 18 year old year old male w/ PMH of   Patient Active Problem List   Diagnosis     Encounter for health examination of refugee     H. pylori infection     Episode of recurrent major depressive disorder, unspecified depression episode severity (H)     MILDRED (generalized anxiety disorder)    who presents for follow-up on stomach pain.  Patient has a multiple history of H. pylori infection and treated twice before.  His treatment included level, amoxicillin, doxycycline, and bismuth.  Recent EGD revealed patient has H. pylori and giardiasis.  He stated he have stomach pain, intermittent, dull discomfort.  Denies nausea, vomiting,.  His pain associated with bloating  stomach.  .        A B-152  was used for  this visit.    +++++++      Problem, Medication and Allergy Lists were   reviewed and updated if needed.     Patient Active Problem List    Diagnosis Date Noted     Episode of recurrent major depressive disorder, unspecified depression episode severity (H) 04/19/2023     Priority: Medium     MILDRED (generalized anxiety disorder) 04/19/2023     Priority: Medium     H. pylori infection 04/12/2023     Priority: Medium     Encounter for health examination of refugee 05/18/2022     Priority: Medium       Patient is an established patient of this clinic.         Review of Systems:   12 point ROS negative other than as specified above.         Physical Exam:   BP 99/64   Pulse 69   Temp 98.1  F (36.7  C)   Resp 18   Wt 64 kg (141 lb 3.2 oz)   SpO2 100%   BMI 27.58 kg/m      Vitals were reviewed and were normal     Exam:  Constitutional: healthy, alert, no distress, and cooperative  Head: Normocephalic. No masses, lesions, tenderness or abnormalities  Neck: Neck supple. No adenopathy.  ENT: throat normal without erythema or exudate  Cardiovascular: RRR w/o audible murmur  Respiratory: bilateral clear lungs w/o wheezing, crackles or rhonchi; breathing comfortably on RA  Gastrointestinal: Abdomen soft, non-tender. BS normal.  : Deferred  Musculoskeletal: extremities normal- no gross deformities noted, gait normal, and normal muscle tone  Skin: no suspicious lesions or rashes  Neurologic: grossly normal CN; normal strength, sensation, & tone  Psychiatric: mentation appears normal and affect normal/bright        Results:    Results from this visitNo results found for any visits on 06/06/23.

## 2023-06-06 NOTE — PROGRESS NOTES
Preceptor Attestation:    I discussed the patient with the resident and evaluated the patient in person. I have verified the content of the note, which accurately reflects my assessment of the patient and the plan of care.  Agree with choice of clarithro/flagyl/ppi in this case.     Supervising Physician:  Arya Egan MD.

## 2023-11-01 ENCOUNTER — OFFICE VISIT (OUTPATIENT)
Dept: FAMILY MEDICINE | Facility: CLINIC | Age: 18
End: 2023-11-01
Payer: COMMERCIAL

## 2023-11-01 VITALS
RESPIRATION RATE: 18 BRPM | TEMPERATURE: 98 F | BODY MASS INDEX: 28.63 KG/M2 | OXYGEN SATURATION: 98 % | DIASTOLIC BLOOD PRESSURE: 79 MMHG | WEIGHT: 146.6 LBS | HEART RATE: 88 BPM | SYSTOLIC BLOOD PRESSURE: 105 MMHG

## 2023-11-01 DIAGNOSIS — J06.9 VIRAL URI WITH COUGH: Primary | ICD-10-CM

## 2023-11-01 PROCEDURE — 99213 OFFICE O/P EST LOW 20 MIN: CPT | Mod: GC

## 2023-11-01 PROCEDURE — 87635 SARS-COV-2 COVID-19 AMP PRB: CPT

## 2023-11-01 RX ORDER — IBUPROFEN 400 MG/1
400 TABLET, FILM COATED ORAL EVERY 6 HOURS PRN
Qty: 60 TABLET | Refills: 0 | Status: SHIPPED | OUTPATIENT
Start: 2023-11-01

## 2023-11-01 RX ORDER — BENZONATATE 200 MG/1
200 CAPSULE ORAL 3 TIMES DAILY PRN
Qty: 30 CAPSULE | Refills: 0 | Status: SHIPPED | OUTPATIENT
Start: 2023-11-01

## 2023-11-01 RX ORDER — ACETAMINOPHEN 500 MG
500-1000 TABLET ORAL EVERY 6 HOURS PRN
Qty: 90 TABLET | Refills: 3 | Status: SHIPPED | OUTPATIENT
Start: 2023-11-01

## 2023-11-01 NOTE — LETTER
[facility] [address] [place] [address]  Phone: [phone]  Fax: [phone]    November 1, 2023        Ranjit Mojica  [address] [place] [address]          To whom it may concern:    RE: Ranjit Mojica    Patient was seen and treated today at our clinic. He may return to work 11/2/23.     Please contact me for questions or concerns.      Sincerely,        Grupo Davila, DO

## 2023-11-01 NOTE — LETTER
November 1, 2023        Ranjit Mojica  [address]  [place] [address]          To whom it may concern:    This patient missed school 11/1/2023 due to a clinic visit.  May return 11/2/23    Please contact me for questions or concerns.        Sincerely,        Grupo Davila DO

## 2023-11-01 NOTE — PROGRESS NOTES
Assessment & Plan     Viral URI with cough  Patient presenting with cough and without fever for 7 days duration. Vitals stable. Exam unremarkable.  COVID pending  Will proceed with conservative management. Discussed Tylenol and ibuprofen for discomfort and fever, nasal saline for congestion, and oral decongestants or mucolytics.  Also discussed patient to return if worsening or new concerning symptoms.   - benzonatate (TESSALON) 200 MG capsule; Take 1 capsule (200 mg) by mouth 3 times daily as needed for cough  - ibuprofen (ADVIL/MOTRIN) 400 MG tablet; Take 1 tablet (400 mg) by mouth every 6 hours as needed for moderate pain  - Symptomatic COVID-19 Virus (Coronavirus) by PCR Nose    [facility] [facility]    Carlos Eduardo Church is a 18 year old, presenting for the following health issues:  Cough (Been cough a lot, sore throat been going on for a week, feel like there is a muscus blocking the throat )    HPI     Acute Illness  Acute illness concerns: Cold like symptoms.   Onset/Duration: 7 days  Symptoms:  Fever: No  Chills/Sweats: No  Headache (location?): No  Sinus Pressure: No  Conjunctivitis:  No  Ear Pain: no  Rhinorrhea: No  Congestion: No  Sore Throat: YES  Cough: YES - Dry cough worse at night  Wheeze: No  Decreased Appetite: No  Nausea: No  Vomiting: No  Diarrhea: No  Dysuria/Freq.: No  Dysuria or Hematuria: No  Fatigue/Achiness: No  Sick/Strep Exposure: YES- brother  Therapies tried and outcome: Stomach medicine with some improvement.     Review of Systems   Constitutional, HEENT, cardiovascular, pulmonary, gi and gu systems are negative, except as otherwise noted.      Objective    /79 (BP Location: Right arm, Patient Position: Sitting, Cuff Size: Adult Regular)   Pulse 88   Temp 98  F (36.7  C) (Oral)   Resp 18   Wt 66.5 kg (146 lb 9.6 oz)   SpO2 98%   BMI 28.63 kg/m    Body mass index is 28.63 kg/m .  Physical Exam   GENERAL: healthy, alert and no  distress  NECK: no adenopathy, no asymmetry, masses, or scars and thyroid normal to palpation  RESP: lungs clear to auscultation - no rales, rhonchi or wheezes  CV: regular rate and rhythm, normal S1 S2, no S3 or S4, no murmur, click or rub, no peripheral edema and peripheral pulses strong  ABDOMEN: soft, nontender, no hepatosplenomegaly, no masses and bowel sounds normal  MS: no gross musculoskeletal defects noted, no edema    No results found for this or any previous visit (from the past 24 hour(s)).    ----- Service Performed and Documented by Resident or Fellow ------

## 2023-11-01 NOTE — PATIENT INSTRUCTIONS
You were seen today for an upper respiratory infection. This is likely due to a viral illness.    Symptom management:  - Get plenty of rest  - Avoid smoking and second hand smoke  - May take tylenol or ibuprofen for fever/discomfort  - Drink plenty of non-caffeinated fluids    Reasons to be seen in the emergency room:  - Develop a new fever of 100.4 or higher  - Cough changes, coughing up blood, or become short of breath  - Neck stiffness  - Chest pain  - Severe headache  - Unable to tolerate eating or drinking fluids    Otherwise, if no symptom improvement after 5 days, follow-up with your primary care provider.

## 2023-11-01 NOTE — PROGRESS NOTES
Preceptor Attestation:    I discussed the patient with the resident and evaluated the patient in person. I have verified the content of the note, which accurately reflects my assessment of the patient and the plan of care.   Supervising Physician:  Indejrit Keenan DO.

## 2023-11-02 LAB — SARS-COV-2 RNA RESP QL NAA+PROBE: NEGATIVE

## 2023-11-07 ENCOUNTER — OFFICE VISIT (OUTPATIENT)
Dept: FAMILY MEDICINE | Facility: CLINIC | Age: 18
End: 2023-11-07
Payer: COMMERCIAL

## 2023-11-07 ENCOUNTER — ANCILLARY PROCEDURE (OUTPATIENT)
Dept: GENERAL RADIOLOGY | Facility: CLINIC | Age: 18
End: 2023-11-07
Attending: FAMILY MEDICINE
Payer: COMMERCIAL

## 2023-11-07 VITALS
OXYGEN SATURATION: 100 % | SYSTOLIC BLOOD PRESSURE: 121 MMHG | TEMPERATURE: 97.9 F | RESPIRATION RATE: 18 BRPM | DIASTOLIC BLOOD PRESSURE: 74 MMHG | HEART RATE: 89 BPM | BODY MASS INDEX: 28.71 KG/M2 | WEIGHT: 147 LBS

## 2023-11-07 DIAGNOSIS — R05.1 ACUTE COUGH: Primary | ICD-10-CM

## 2023-11-07 DIAGNOSIS — Z86.19 HISTORY OF HELICOBACTER PYLORI INFECTION: ICD-10-CM

## 2023-11-07 DIAGNOSIS — R05.1 ACUTE COUGH: ICD-10-CM

## 2023-11-07 PROCEDURE — 71046 X-RAY EXAM CHEST 2 VIEWS: CPT | Mod: TC | Performed by: RADIOLOGY

## 2023-11-07 PROCEDURE — 99214 OFFICE O/P EST MOD 30 MIN: CPT | Mod: GC

## 2023-11-07 RX ORDER — AZITHROMYCIN 250 MG/1
TABLET, FILM COATED ORAL
Qty: 6 TABLET | Refills: 0 | Status: SHIPPED | OUTPATIENT
Start: 2023-11-07 | End: 2023-11-12

## 2023-11-07 NOTE — LETTER
November 24, 2023      Ranjit DAN Yunior  [address] [place] [address]        Dear ,    We are writing to inform you of your test results.    As we discussed on the phone last week, you have ongoing infection with H pylori. There may be an oral antibiotic regimen we could try or you may have to get another EGD done to get more specific information about the H pylori to guide treatment.      You need another clinic appt to discuss this finding and medication prescription or referral, if needed. Please call [phone] to schedule this appt.     Resulted Orders   Helicobacter pylori Antigen Stool   Result Value Ref Range    Helicobacter pylori Antigen Stool Positive (A) Negative      Comment:      Positive for Helicobacter pylori antigen by enzyme immunoassay. A positive result indicates the presence of H. pylori antigen.       If you have any questions or concerns, please call the clinic at the number listed above.       Sincerely,      Poppy Gomez MD

## 2023-11-07 NOTE — LETTER
November 7, 2023      Ranjit Mojica  [address] [place] [address]        To Whom It May Concern:    Ranjit Mojica was seen in our clinic. Please excuse him from school.  Sincerely,        Izabel Farrell MD

## 2023-11-07 NOTE — PROGRESS NOTES
Preceptor attestation:  Vital signs reviewed: /74 (BP Location: Right arm, Patient Position: Sitting, Cuff Size: Adult Regular)   Pulse 89   Temp 97.9  F (36.6  C) (Oral)   Resp 18   Wt 66.7 kg (147 lb)   SpO2 100%   BMI 28.71 kg/m      Patient seen, evaluated, and discussed with the resident.  I verified the content of the note, which accurately reflects my assessment of the patient and the plan of care.    I reviewed the chest x-ray, and I agree with Dr. Farrell' interpretation.    Supervising physician: Maura Alvarez MD  [facility]

## 2023-11-07 NOTE — PROGRESS NOTES
Assessment & Plan     Acute cough  18-year-old male with history of recurrent H pylori infection, MDD, and MILDRED presents with two weeks of persistent cough with copious yellow sputum production in addition to an episode of hematemesis 3 days ago.  Has been taking Tessalon, ibuprofen, and Tylenol without significant improvement in symptoms.  Physical exam without obvious extra or abnormal lung sounds.  Vital signs reassuring.  Denies fever.  Differential includes bronchitis, pneumonia, pertussis, postviral cough, or tuberculosis, among other diagnoses.  With persistent severe cough, will obtain chest x-ray and empirically treat with azithromycin at this time.  Patient will be contacted if chest x-ray requires change in management.  Return precautions reviewed.  - azithromycin (ZITHROMAX) 250 MG tablet  Dispense: 6 tablet; Refill: 0  - XR CHEST 2 VW    History of Helicobacter pylori infection  Patient also has history of recurrent H. pylori infection with 3 prior rounds of treatment including most recently in June 2023 after an EGD with GI in May 2023 showing recurrent H. pylori infection and Giardia infection.  Patient states he took the medications for treatment as prescribed but has not yet been retested.  States abdominal pain associated with prior H. pylori infections has improved.  Ongoing H. pylori infection with possible ulceration could account for single episode of hematemesis reported over the weekend.  Will check for H. pylori eradication.  Return precautions reviewed.  - Helicobacter pylori Antigen Stool    Return in about 1 week (around 11/14/2023), or if symptoms worsen or fail to improve.    Izabel Farrell MD  [facility]    Carlos Eduardo Church is a 18 year old, presenting for the following health issues:  Cough (coughing) and Vomiting (Vomited Sunday with some blood)        11/7/2023    11:07 AM   Additional Questions   Roomed by mary kay   Accompanied by self       HPI  "  18-year-old male with history of recurrent H pylori infection, MDD, and MILDRED presents with two weeks of persistent cough. He was seen 11/1/23 in clinic for 7 days of cough and had a negative COVID test at that time with presumed viral URI symptoms however he predominately had cough. He states since that time the symptoms have stayed the same and not gotten any better. He endorses yellow sputum with bitter taste and states he has to stop talking because of the amount of sputum he has in his mouth.  He also shares that on Saturday he was feeling weak, lightheaded, became nauseous and had vomiting with \"pieces of blood\" that was dark red in color.  He states since that time he has not had any more nausea or vomiting has not noticed any other blood.  Denies any blood in his sputum.    Denies any fever or chills.  He does endorse some shortness of breath when he is coughing hard.  He endorses some fatigue however states this has improved in recent days.    Patient also has history of H. pylori infection has been treated 3 separate times for H. pylori in the past including after an EGD in April 2023 noting persistent H. pylori infection and Giardia infection.  Patient states that his abdominal pain associated with H. pylori has improved in recent months however he is overall frustrated that he has been treated multiple times and told he has persistent infection.  Patient has not been tested for H. pylori since treatment in July 2023.        Objective    /74 (BP Location: Right arm, Patient Position: Sitting, Cuff Size: Adult Regular)   Pulse 89   Temp 97.9  F (36.6  C) (Oral)   Resp 18   Wt 66.7 kg (147 lb)   SpO2 100%   BMI 28.71 kg/m    Body mass index is 28.71 kg/m .  Physical Exam   Constitutional: awake, alert, cooperative, no apparent distress  ENT: Normocephalic, without obvious abnormality, atraumatic  Respiratory: No increased work of breathing, good air exchange, clear to auscultation bilaterally, " no crackles or wheezing  Cardiovascular: Regular rate and rhythm  Neurologic: Awake, alert, oriented to name, place and time.  Cranial nerves II-XII are grossly intact.    ----- Service Performed and Documented by Resident or Fellow ------

## 2023-11-08 PROCEDURE — 87338 HPYLORI STOOL AG IA: CPT

## 2023-11-09 LAB — H PYLORI AG STL QL IA: POSITIVE

## 2023-11-16 ENCOUNTER — OFFICE VISIT (OUTPATIENT)
Dept: FAMILY MEDICINE | Facility: CLINIC | Age: 18
End: 2023-11-16
Payer: COMMERCIAL

## 2023-11-16 VITALS
OXYGEN SATURATION: 97 % | TEMPERATURE: 97.8 F | SYSTOLIC BLOOD PRESSURE: 102 MMHG | HEIGHT: 60 IN | HEART RATE: 71 BPM | WEIGHT: 144 LBS | RESPIRATION RATE: 18 BRPM | BODY MASS INDEX: 28.27 KG/M2 | DIASTOLIC BLOOD PRESSURE: 69 MMHG

## 2023-11-16 DIAGNOSIS — K21.9 GASTROESOPHAGEAL REFLUX DISEASE, UNSPECIFIED WHETHER ESOPHAGITIS PRESENT: ICD-10-CM

## 2023-11-16 DIAGNOSIS — R10.13 DYSPEPSIA: ICD-10-CM

## 2023-11-16 DIAGNOSIS — A04.8 H. PYLORI INFECTION: Primary | ICD-10-CM

## 2023-11-16 PROCEDURE — 99214 OFFICE O/P EST MOD 30 MIN: CPT | Mod: GC

## 2023-11-16 RX ORDER — FAMOTIDINE 20 MG/1
20 TABLET, FILM COATED ORAL 2 TIMES DAILY
Qty: 90 TABLET | Refills: 3 | Status: SHIPPED | OUTPATIENT
Start: 2023-11-16

## 2023-11-16 NOTE — LETTER
To whom it may concern,    Please excuse patient from school on 11/16/2023 for a doctor's appointment. If you have any further questions, please call the number listed above.      Sincerely,        Heather Trejo MD, PGY3  [facility]

## 2023-11-16 NOTE — PATIENT INSTRUCTIONS
Thank you for coming to see me today in clinic!    Today we discussed:  -Dyspepsia (stomach pain): You are positive for H. pylori.  This will be looked at through an EGD.  They will give further recommendations on how to treat this and the medications that we can use.  -Start taking famotidine after your EGD, imaging appointment.    If you have any further questions, please call the clinic!    Have a wonderful rest of your day!

## 2023-11-16 NOTE — PROGRESS NOTES
Assessment & Plan     H. pylori infection  Dyspepsia  Per chart review, patient has been treated twice with triple therapy and different regimens.  Each test of eradication came back positive.  Considered complicated infection at this time.  Patient likely needs quadruple therapy versus other treatment regimen.  Recommend EGD and GI consult for further treatment and cares for patient.  Famotidine given to help with dyspepsia.  - Adult GI  Referral - Procedure Only; Future  - Adult GI  Referral - Consult Only; Future  -Reviewed results of H. pylori testing from 8 days ago, 8 months ago, and 1 year ago.  -Reviewed notes and treatment prescribed from 6/6/2023 and 4/12/2023.    Gastroesophageal reflux disease, unspecified whether esophagitis present  Likely secondary to H. pylori infection.  Infection been present for over a year.  Currently not taking any medications for stomach relief.  - famotidine (PEPCID) 20 MG tablet; Take 1 tablet (20 mg) by mouth 2 times daily      Patient was staffed with supervising physician, Dr. Davonte Soliz .    Heather Trejo MD  [facility]    Carlos Eduardo Church is a 18 year old, presenting for the following health issues:  Throat Problem (Throat and chest ) and OTHER (Test kit for H. POLORI ? )        11/16/2023     1:21 PM   Additional Questions   Roomed by trisha   Accompanied by self       HPI     Acute Illness  Acute illness concerns: sore throat  Onset/Duration: 3-4 weeks  Symptoms:  Fever: No  Chills/Sweats: No  Sinus Pressure: No  Conjunctivitis:  No  Ear Pain: no  Rhinorrhea: No  Congestion: No  Cough: YES-improving  Vomiting: once    Endorses having liquid in throat which makes it hard to talk. It is neither soar or sweet. Feels as though liquids get stuck in his throat. No difficulty with swallowing foods. No chest pain.    Patient does have a history of H. Pylori.  Which has been treated twice with triple therapy.  6/6/2023: Metronidazole  500 mg 3 times a day, clarithromycin 500 mg twice daily, omeprazole 20 mg twice daily, amoxicillin 500 mg twice daily  4/12/2023: levofloxacin 500 mg daily, amoxicillin 750 mg TID, and omeprazole 40 mg BID    Review of Systems   ROS is negative other than what is listed in the HPI.      Objective    /69 (BP Location: Right arm, Patient Position: Sitting, Cuff Size: Adult Regular)   Pulse 71   Temp 97.8  F (36.6  C) (Oral)   Resp 18   Ht 1.524 m (5')   Wt 65.3 kg (144 lb)   SpO2 97%   BMI 28.12 kg/m    Body mass index is 28.12 kg/m .    Physical Exam   General appearance: alert, in no distress, cooperative  Head: normocephalic, without obvious abnormalities  Eyes: conjunctivae/corneas clear  Ears: hearing grossly intact  Nose: nares normal, no drainage  Lung: clear to auscultation bilaterally, no wheezing, coughing, or use of accessory muscles  Heart: regular rate and rhythm, S1, S2 normal, no murmur, click, rub, or gallop  Abdomen: Soft, tenderness in epigastric region and bilateral lower abdomen, nondistended  Neurologic: Ambulatory, spontaneously moving upper and lower extremities without pain      ----- Service Performed and Documented by Resident or Fellow ------

## 2023-11-20 ENCOUNTER — DOCUMENTATION ONLY (OUTPATIENT)
Dept: GASTROENTEROLOGY | Facility: CLINIC | Age: 18
End: 2023-11-20
Payer: COMMERCIAL

## 2023-11-20 NOTE — PROGRESS NOTES
Faxed request to [facility] to obtain records pertaining to recent referral.     Clinic Information:  [facility]  Phone #: [phone] extension [phone]  Fax #: [phone] sk

## 2023-11-21 NOTE — PROGRESS NOTES
Called [facility] to follow-up on request. [facility] denied receipt and requested fax be re-sent.    Re-faxed request. Will follow-up as needed.     Clinic Information:  [facility]  Phone #: [phone] extension [phone]  Fax #: [phone] sk

## 2023-11-22 NOTE — PROGRESS NOTES
Called [facility] to follow-up on request. [facility] denied receipt and requested request be re-sent.     E-mailed request.      Clinic Information:  [facility]  Phone #: [phone] extension [phone]  Fax #: [phone]  E-mail: [email or web address]        [email or web address]

## 2023-12-17 DIAGNOSIS — A04.8 H. PYLORI INFECTION: Primary | ICD-10-CM

## 2023-12-18 ENCOUNTER — OFFICE VISIT (OUTPATIENT)
Dept: FAMILY MEDICINE | Facility: CLINIC | Age: 18
End: 2023-12-18
Payer: COMMERCIAL

## 2023-12-18 ENCOUNTER — TELEPHONE (OUTPATIENT)
Dept: OTOLARYNGOLOGY | Facility: CLINIC | Age: 18
End: 2023-12-18

## 2023-12-18 VITALS
BODY MASS INDEX: 23.59 KG/M2 | TEMPERATURE: 98.2 F | RESPIRATION RATE: 20 BRPM | WEIGHT: 146.8 LBS | SYSTOLIC BLOOD PRESSURE: 110 MMHG | OXYGEN SATURATION: 100 % | HEART RATE: 117 BPM | HEIGHT: 66 IN | DIASTOLIC BLOOD PRESSURE: 70 MMHG

## 2023-12-18 DIAGNOSIS — A04.8 H. PYLORI INFECTION: ICD-10-CM

## 2023-12-18 DIAGNOSIS — R04.0 EPISTAXIS: Primary | ICD-10-CM

## 2023-12-18 DIAGNOSIS — J33.9 NASAL POLYP: ICD-10-CM

## 2023-12-18 LAB
ERYTHROCYTE [DISTWIDTH] IN BLOOD BY AUTOMATED COUNT: 11.8 % (ref 10–15)
HCT VFR BLD AUTO: 46.4 % (ref 40–53)
HGB BLD-MCNC: 15.7 G/DL (ref 13.3–17.7)
MCH RBC QN AUTO: 28.1 PG (ref 26.5–33)
MCHC RBC AUTO-ENTMCNC: 33.8 G/DL (ref 31.5–36.5)
MCV RBC AUTO: 83 FL (ref 78–100)
PLATELET # BLD AUTO: 153 10E3/UL (ref 150–450)
RBC # BLD AUTO: 5.58 10E6/UL (ref 4.4–5.9)
WBC # BLD AUTO: 4.3 10E3/UL (ref 4–11)

## 2023-12-18 PROCEDURE — 90715 TDAP VACCINE 7 YRS/> IM: CPT | Performed by: FAMILY MEDICINE

## 2023-12-18 PROCEDURE — 99214 OFFICE O/P EST MOD 30 MIN: CPT | Mod: 25 | Performed by: FAMILY MEDICINE

## 2023-12-18 PROCEDURE — 85027 COMPLETE CBC AUTOMATED: CPT | Performed by: FAMILY MEDICINE

## 2023-12-18 PROCEDURE — 90651 9VHPV VACCINE 2/3 DOSE IM: CPT | Performed by: FAMILY MEDICINE

## 2023-12-18 PROCEDURE — 90471 IMMUNIZATION ADMIN: CPT | Performed by: FAMILY MEDICINE

## 2023-12-18 PROCEDURE — 90472 IMMUNIZATION ADMIN EACH ADD: CPT | Performed by: FAMILY MEDICINE

## 2023-12-18 PROCEDURE — 36415 COLL VENOUS BLD VENIPUNCTURE: CPT | Performed by: FAMILY MEDICINE

## 2023-12-18 PROCEDURE — 90686 IIV4 VACC NO PRSV 0.5 ML IM: CPT | Performed by: FAMILY MEDICINE

## 2023-12-18 RX ORDER — FLUTICASONE PROPIONATE 50 MCG
2 SPRAY, SUSPENSION (ML) NASAL DAILY
Qty: 16 G | Refills: 3 | Status: SHIPPED | OUTPATIENT
Start: 2023-12-18

## 2023-12-18 NOTE — PROGRESS NOTES
Prior to immunization administration, verified patients identity using patient s name and date of birth. Please see Immunization Activity for additional information.     Screening Questionnaire for Adult Immunization    Are you sick today?   No   Do you have allergies to medications, food, a vaccine component or latex?   No   Have you ever had a serious reaction after receiving a vaccination?   No   Do you have a long-term health problem with heart, lung, kidney, or metabolic disease (e.g., diabetes), asthma, a blood disorder, no spleen, complement component deficiency, a cochlear implant, or a spinal fluid leak?  Are you on long-term aspirin therapy?   No   Do you have cancer, leukemia, HIV/AIDS, or any other immune system problem?   No   Do you have a parent, brother, or sister with an immune system problem?   No   In the past 3 months, have you taken medications that affect  your immune system, such as prednisone, other steroids, or anticancer drugs; drugs for the treatment of rheumatoid arthritis, Crohn s disease, or psoriasis; or have you had radiation treatments?   No   Have you had a seizure, or a brain or other nervous system problem?   No   During the past year, have you received a transfusion of blood or blood    products, or been given immune (gamma) globulin or antiviral drug?   No   For women: Are you pregnant or is there a chance you could become       pregnant during the next month?   No   Have you received any vaccinations in the past 4 weeks?   No     Immunization questionnaire answers were all negative.      Patient instructed to remain in clinic for 15 minutes afterwards, and to report any adverse reactions.     Screening performed by Mimi Rm CMA on 12/18/2023 at 12:14 PM.

## 2023-12-18 NOTE — TELEPHONE ENCOUNTER
This encounter is being sent to inform the clinic that this patient has a referral from Shabbir Villalobos MD  for the diagnoses of epistaxis, nasal polyp and has requested that this patient be seen within 3-5 days and/or with any provider.  Based on the availability of our provider(s), we are unable to accommodate this request.    Were all sites offered this patient?  Yes    Does scheduling algorithm request to schedule next available?  Patient has been scheduled for the first available opening with Dr Tadeo on 1/19.  We have informed the patient that the clinic will review their referral and reach out if a sooner appointment is medically necessary.

## 2023-12-18 NOTE — RESULT ENCOUNTER NOTE
Hello, your blood count (hemoglobin) is still good despite your nose bleeds.  Take care, Dr Shabbir Villalobos

## 2023-12-18 NOTE — TELEPHONE ENCOUNTER
FUTURE VISIT INFORMATION      FUTURE VISIT INFORMATION:  Date: 1/2/24  Time: 9:20  Location: [facility]  REFERRAL INFORMATION:  Referring provider:  Shabbir Villalobos MD   Referring providers clinic:  [facility]  Reason for visit/diagnosis  Epistaxis [R04.0]; Nasal polyp [J33.9]  ref by Shabbir Villalobos MD  recs in [facility]  conf loc  sched w/pt     RECORDS REQUESTED FROM:       Clinic name Comments Records Status Imaging Status   [facility] 12/18/23- OV Shabbir Villalobos MD  EPIC

## 2023-12-18 NOTE — LETTER
RETURN TO WORK/SCHOOL FORM    12/18/2023    Re: Ranjit Mojica  2005      To Whom It May Concern:     Ranjit Mojica was seen in clinic today..  He may return to school without restrictions on 12/19/23          Restrictions:  Nonefull duty      Shabbir Villalobos MD  12/18/2023 12:23 PM

## 2023-12-18 NOTE — LETTER
December 18, 2023      Ranjit Mojica  [address] [place] [address]        Dear ,    We are writing to inform you of your test results.    Hello, your blood count (hemoglobin) is still good despite your nose bleeds.  Take care!     Resulted Orders   CBC with platelets   Result Value Ref Range    WBC Count 4.3 4.0 - 11.0 10e3/uL    RBC Count 5.58 4.40 - 5.90 10e6/uL    Hemoglobin 15.7 13.3 - 17.7 g/dL    Hematocrit 46.4 40.0 - 53.0 %    MCV 83 78 - 100 fL    MCH 28.1 26.5 - 33.0 pg    MCHC 33.8 31.5 - 36.5 g/dL    RDW 11.8 10.0 - 15.0 %    Platelet Count 153 150 - 450 10e3/uL       If you have any questions or concerns, please call the clinic at the number listed above.       Sincerely,      Shabbir Villalobos MD

## 2023-12-18 NOTE — PATIENT INSTRUCTIONS
You appear to have polyps in your nostrils.  This may be the source of the nosebleeds but also is causing less air to pass through your nose when you inhale.

## 2023-12-20 NOTE — PROGRESS NOTES
Diagnoses and associated orders for this visit:  Epistaxis  -     CBC with platelets; Future  -     Adult ENT  Referral; Future  -     CBC with platelets    Nasal polyp  -     Adult ENT  Referral; Future  -     fluticasone (FLONASE) 50 MCG/ACT nasal spray; Spray 2 sprays into both nostrils daily    H. pylori infection    Other orders  -     HPV9 (GARDASIL 9)  -     INFLUENZA VACCINE >6 MONTHS (AFLURIA/FLUZONE)  -     TDAP VACCINE (Adacel, Boostrix)  [[id number]]      This patient may have nasal polyps contributing to his epistaxis and I therefore have referred to ENT for further evaluation.  I instructed him in proper technique to manage epistaxis, to lean forward and to firmly grasp the bony bridge of his nose and apply pressure until nosebleed stops.  His hemoglobin is stable.  I did start a fluticasone nasal spray given the probability of an allergic component to his turbinate hypertrophy/nasal polyps.    I shared with him the phone number for GI and assisted him to call them and  will remain on the line to assist him in scheduling a GI appointment.    He agrees to have needed immunizations today.    Total visit time with patient was 25 mins, all of which was face to face MD time, and over 50% of this time was spent in counseling and coordination of care.  Including post-encounter documentation and orders on the date of service, total encounter time was 32 mins.        Carlos Eduardo Church is a 18 year old, presenting for the following health issues:  Nose Bleeds (Nose bleed x2 week, bleeding last for 3-4 mins, discomfort/) and Medication Reconciliation (Med reviewed, pt state not taking any med currently)        12/18/2023    11:31 AM   Additional Questions   Roomed by Mimi   Accompanied by patient       HPI   This is a 18-year-old refugee from [place].  He presents with approximately a 2-week history of recurring nosebleeds from both nostrils.  He says it started with mainly his  "right nostril but now is more occurring on the left.  He identifies an area midway up the nose where he believes the bleeding is coming from.  He says it has been heavy.  Sometimes it last for several minutes.  He tends to hold his head up in the air when he has the nosebleed to try and stop it.  He is not aware of swallowing blood but this could be occurring.  He does not pick at his nose.  He has had no trauma and does not understand why the started.  He has felt somewhat lightheaded and weak and worries that he may be has lost too much blood.      Review of Systems   GI: He was recently seen for recurrence of upper GI symptoms.  He believes that he has to have another EGD before he can get treatment for H. pylori infection again.  I do see a note in the chart where the GI department has been trying to reach him.  He appreciates the assistance with following up with them to schedule an appointment due to his limited English skills.  Psych: He has a history of anxiety and depression but says that he is doing okay presently.        Objective    /70 (BP Location: Left arm, Patient Position: Sitting, Cuff Size: Adult Regular)   Pulse 117   Temp 98.2  F (36.8  C) (Oral)   Resp 20   Ht 1.67 m (5' 5.75\")   Wt 66.6 kg (146 lb 12.8 oz)   SpO2 100%   BMI 23.87 kg/m    Body mass index is 23.87 kg/m .  Physical Exam   His vitals are stable  His tympanic membranes are visible bilaterally - with some partly obstructing cerumen on the left side - and they appear healthy  No bleeding is visible in his nares and Manuel area on both sides does not show irritation or bleeding.  He does however have prominent turbinates bilaterally which may be nasal polyps.  They obstruct more than 50% of the nasal airspace bilaterally.    Results for orders placed or performed in visit on 12/18/23   CBC with platelets     Status: Normal   Result Value Ref Range    WBC Count 4.3 4.0 - 11.0 10e3/uL    RBC Count 5.58 4.40 - 5.90 10e6/uL "    Hemoglobin 15.7 13.3 - 17.7 g/dL    Hematocrit 46.4 40.0 - 53.0 %    MCV 83 78 - 100 fL    MCH 28.1 26.5 - 33.0 pg    MCHC 33.8 31.5 - 36.5 g/dL    RDW 11.8 10.0 - 15.0 %    Platelet Count 153 150 - 450 10e3/uL

## 2023-12-21 ENCOUNTER — TELEPHONE (OUTPATIENT)
Dept: GASTROENTEROLOGY | Facility: CLINIC | Age: 18
End: 2023-12-21
Payer: COMMERCIAL

## 2023-12-21 NOTE — TELEPHONE ENCOUNTER
"Endoscopy Scheduling Screen    Have you had a positive Covid test in the last 14 days?  No    Are you active on MyChart?   No    What insurance is in the chart?  [employer]    Ordering/Referring Provider: LEONARDA WILSON    (If ordering provider performs procedure, schedule with ordering provider unless otherwise instructed. )    BMI: Estimated body mass index is 23.87 kg/m  as calculated from the following:    Height as of 12/18/23: 1.67 m (5' 5.75\").    Weight as of 12/18/23: 66.6 kg (146 lb 12.8 oz).     Sedation Ordered  moderate sedation.   If patient BMI > 50 do not schedule in [facility].    If patient BMI > 45 do not schedule at [facility].    Are you taking methadone or Suboxone?  No    Are you taking any prescription medications for pain 3 or more times per week?   NO - No RN review required.    Do you have a history of malignant hyperthermia or adverse reaction to anesthesia?  No    (Females) Are you currently pregnant?   No     Have you been diagnosed or told you have pulmonary hypertension?   No    Do you have an LVAD?  No    Have you been told you have moderate to severe sleep apnea?  No    Have you been told you have COPD, asthma, or any other lung disease?  No    Do you have any heart conditions?  No     Have you ever had an organ transplant?   No    Have you ever had or are you awaiting a heart or lung transplant?   No    Have you had a stroke or transient ischemic attack (TIA aka \"mini stroke\" in the last 6 months?   No    Have you been diagnosed with or been told you have cirrhosis of the liver?   No    Are you currently on dialysis?   No    Do you need assistance transferring?   No    BMI: Estimated body mass index is 23.87 kg/m  as calculated from the following:    Height as of 12/18/23: 1.67 m (5' 5.75\").    Weight as of 12/18/23: 66.6 kg (146 lb 12.8 oz).     Is patients BMI > 40 and scheduling location [facility]?  No    Do you take an injectable medication for weight loss or diabetes (excluding " insulin)?  No    Do you take the medication Naltrexone?  No    Do you take blood thinners?  No       Prep   Are you currently on dialysis or do you have chronic kidney disease?  No    Do you have a diagnosis of diabetes?  No    Do you have a diagnosis of cystic fibrosis (CF)?  No    On a regular basis do you go 3 -5 days between bowel movements?  No    BMI > 40?  No    Preferred Pharmacy:    [id number] - [place], [place] - [address] AT [address]  [address] [place] [address]  Phone: [phone] Fax: [phone]      Final Scheduling Details   Colonoscopy prep sent?  N/A    Procedure scheduled  Upper endoscopy (EGD)    Surgeon:  spencer     Date of procedure:  12/28     Pre-OP / PAC:   No - Not required for this site.    Location  [facility] - [facility] - Per order.    Sedation   Moderate Sedation - Per order.      Patient Reminders:   You will receive a call from a Nurse to review instructions and health history.  This assessment must be completed prior to your procedure.  Failure to complete the Nurse assessment may result in the procedure being cancelled.      On the day of your procedure, please designate an adult(s) who can drive you home stay with you for the next 24 hours. The medicines used in the exam will make you sleepy. You will not be able to drive.      You cannot take public transportation, ride share services, or non-medical taxi service without a responsible caregiver.  Medical transport services are allowed with the requirement that a responsible caregiver will receive you at your destination.  We require that drivers and caregivers are confirmed prior to your procedure.

## 2023-12-21 NOTE — TELEPHONE ENCOUNTER
Pre assessment completed for upcoming procedure.    [employer]  ID # [id number]    Procedure details:    Patient scheduled for Upper endoscopy (EGD) on 12/28/23.     Arrival time: 0930. Procedure time 1030    Pre op exam needed? N/A    [facility] [address] [place] [address], [address] Floor, [place] [address]    Sedation type: Conscious sedation     Indication for procedure:   H. pylori infection [A04.8]  - Primary      Gastroesophageal reflux disease, unspecified whether esophagitis present          COVID policy reviewed.    Designated  policy reviewed. Instructed to have someone stay 6 hours post procedure.       Chart review:     Electronic implanted devices? No    Recent diagnosis of diverticulitis within the last 6 weeks?  No    Diabetic? No    Diabetic medication HOLDING recommendations: (if applicable)  Oral diabetic medications: No  Diabetic injectables: No  Insulin: No    Medication review:    Anticoagulants? No    NSAIDS? Yes.  Ibuprofen (Advil, Motrin).  Holding interval of 1 day before procedure.    Other medication HOLDING recommendations:  N/A      Prep for procedure:     Prep instructions sent via [employer]     Reviewed procedure prep instructions.     Patient verbalized understanding and had no questions or concerns at this time.        Ashley Mccarthy RN  Endoscopy Procedure Pre Assessment RN  [phone] option 4

## 2023-12-27 ENCOUNTER — OFFICE VISIT (OUTPATIENT)
Dept: GASTROENTEROLOGY | Facility: CLINIC | Age: 18
End: 2023-12-27
Payer: COMMERCIAL

## 2023-12-27 VITALS — WEIGHT: 146.7 LBS | BODY MASS INDEX: 23.86 KG/M2

## 2023-12-27 DIAGNOSIS — A04.8 H. PYLORI INFECTION: Primary | ICD-10-CM

## 2023-12-27 PROCEDURE — G0463 HOSPITAL OUTPT CLINIC VISIT: HCPCS | Performed by: PHARMACIST

## 2023-12-27 NOTE — Clinical Note
12/27/2023         RE: Ranjit Mojica  [address] [place] [address]        Dear Colleague,    Thank you for referring your patient, Ranjit Mojica, to the [facility]. Please see a copy of my visit note below.    Medication Therapy Management (MTM) Encounter    ASSESSMENT:                            Medication Adherence/Access: No issues identified    H. Pylori:        PLAN:                            Will plan to start rifabutin triple therapy   Will discuss with Dr. Gomez  Results of EGD will inform subsequent management    Follow-up: {followuptest2:[id number]}    SUBJECTIVE/OBJECTIVE:                          Ranjit Mojica is a 18 year old male coming in for an initial visit. He was referred to me from Poppy Gomez MD.  [id number]    Reason for visit: refractory H. Pylori treatment.    Allergies/ADRs: Reviewed in chart  Past Medical History: Reviewed in chart  Tobacco: He reports that he has never smoked. He has never been exposed to tobacco smoke. He has never used smokeless tobacco.  Alcohol: none      Medication Adherence/Access: no issues reported    H. Pylori:   Famotidine 20 mg twice daily    Dietary education was provided including avoiding salty or fatty foods, processed meats, coffee, spicy foods.     No adherence concerns with previous treatment regimens.     Previous treatment regimens:   6/6/2023: Metronidazole 500 mg 3 times a day, clarithromycin 500 mg twice daily, omeprazole 20 mg twice daily, amoxicillin 500 mg twice daily  4/12/2023: levofloxacin 500 mg daily, amoxicillin 750 mg TID, and omeprazole 40 mg TWICE DAILY  10/13/22: bismuth subsalicylate 524 mg four times daily, tetracycline 500 mg four times daily, metronidazole 500 mg four times daily, omeprazole 20 mg twice daily x 10 days    Previous Macrolide exposure: yes, clarithromycin and azithromycin  Penicillin Allergy: NKDA  KIDNEY: No known chronic kidney disease  LIVER: No known chronic  liver disease, LFTs normal 2/14/23  Drug-drug interactions: No dairy, multivitamins or antacids     GFR Estimate   Date Value Ref Range Status   02/14/2023 >90 >60 mL/min/1.73m2 Final     Comment:     eGFR calculated using 2021 CKD-EPI equation.         ----------------      I spent 30 minutes with this patient today. All changes were made via verbal approval with Edith Ivy MD who was covering for referring provider Poppy Gomez MD. A copy of the visit note was provided to the patient's provider(s).    A summary of these recommendations was sent via [employer].    Teto Boyce, PharmD, BCPS  MTM Pharmacist   [facility]  Phone: [phone]         Medication Therapy Recommendations  No medication therapy recommendations to display       Medication Therapy Management (MTM) Encounter    ASSESSMENT:                            Medication Adherence/Access: No issues identified    H. Pylori:  Ranjit would benefit from salvage therapy of his H. Pylori. Given that he has failed bismuth-, clarithromycin- and levofloxacin-containing regimens, I recommend treatment with rifabutin triple therapy. CBC with differential and LFTs WNL. No risk of drug-drug interactions with his current medications. Results of H. Pylori sensitivity can inform subsequent management, if needed. He will be indicated for eradication testing no sooner than 4 weeks after completion of his antibiotics. He should be off PPI for at least 1-2 weeks prior to eradication testing.     PLAN:                            Will plan to start rifabutin triple therapy x14 days  Rifabutin 300 mg once daily  Amoxicillin 750 mg three times daily  Esomeprazole 20 mg twice daily  We will do testing to make sure that the H. Pylori is gone no sooner than 4 weeks after completion of antibiotics. You will want to be off of PPIs such as omeprazole and esomeprazole for at least 1-2 weeks prior to providing a stool sample.     Follow-up: 1 week  check-in; eradication testing no sooner than 4 weeks after completion of antibiotics.     EDUCATION:     We reviewed H pylori today including background information, indications for treatment and potential modes of transmission. We also discussed potential for antibiotic resistance and importance of finishing treatment if able, as well as follow-up testing. Discussed rifabutin triple therapy today including medications, dosing, side effects, precautions and general counseling information.    SUBJECTIVE/OBJECTIVE:                          Ranjit Mojica is a 18 year old male coming in for an initial visit. He was referred to me from Poppy Gomez MD.  [id number]    Reason for visit: refractory H. Pylori treatment.    Allergies/ADRs: Reviewed in chart  Past Medical History: Reviewed in chart  Tobacco: He reports that he has never smoked. He has never been exposed to tobacco smoke. He has never used smokeless tobacco.  Alcohol: none      Medication Adherence/Access: no issues reported    H. Pylori:   Famotidine 20 mg twice daily    Dietary education was provided including avoiding salty or fatty foods, processed meats, coffee, spicy foods.     No adherence concerns with previous treatment regimens.     Previous treatment regimens:   6/6/2023: Metronidazole 500 mg 3 times a day, clarithromycin 500 mg twice daily, omeprazole 20 mg twice daily, amoxicillin 500 mg twice daily  4/12/2023: levofloxacin 500 mg daily, amoxicillin 750 mg TID, and omeprazole 40 mg TWICE DAILY  10/13/22: bismuth subsalicylate 524 mg four times daily, tetracycline 500 mg four times daily, metronidazole 500 mg four times daily, omeprazole 20 mg twice daily x 10 days    Previous Macrolide exposure: yes, clarithromycin and azithromycin  Penicillin Allergy: NKDA  KIDNEY: No known chronic kidney disease  LIVER: No known chronic liver disease, LFTs normal 2/14/23  Drug-drug interactions: No dairy, multivitamins or antacids     GFR Estimate   Date Value  Ref Range Status   02/14/2023 >90 >60 mL/min/1.73m2 Final     Comment:     eGFR calculated using 2021 CKD-EPI equation.     Lab Results   Component Value Date    WBC 4.3 12/18/2023     Lab Results   Component Value Date    RBC 5.58 12/18/2023     Lab Results   Component Value Date    HGB 15.7 12/18/2023     Lab Results   Component Value Date    HCT 46.4 12/18/2023     Lab Results   Component Value Date    MCV 83 12/18/2023     Lab Results   Component Value Date    MCH 28.1 12/18/2023     Lab Results   Component Value Date    MCHC 33.8 12/18/2023     Lab Results   Component Value Date    RDW 11.8 12/18/2023     Lab Results   Component Value Date     12/18/2023         ----------------      I spent 30 minutes with this patient today. All changes were made via verbal approval with Edith Ivy MD who was covering for referring provider Poppy Gomez MD. A copy of the visit note was provided to the patient's provider(s).    A summary of these recommendations was sent via [employer].    Teto Boyce, PharmD, BCPS  MTM Pharmacist   [facility]  Phone: [phone]         Medication Therapy Recommendations  No medication therapy recommendations to display         Again, thank you for allowing me to participate in the care of your patient.        Sincerely,        Teto Boyce RP

## 2023-12-27 NOTE — PROGRESS NOTES
Medication Therapy Management (MTM) Encounter    ASSESSMENT:                            Medication Adherence/Access: No issues identified    H. Pylori:  Ranjit would benefit from salvage therapy of his H. Pylori. Given that he has failed bismuth-, clarithromycin- and levofloxacin-containing regimens, I recommend treatment with rifabutin triple therapy. CBC with differential and LFTs WNL. No risk of drug-drug interactions with his current medications. Results of H. Pylori sensitivity can inform subsequent management, if needed. He will be indicated for eradication testing no sooner than 4 weeks after completion of his antibiotics. He should be off PPI for at least 1-2 weeks prior to eradication testing.     PLAN:                            Will plan to start rifabutin triple therapy x14 days  Rifabutin 300 mg once daily  Amoxicillin 750 mg three times daily  Esomeprazole 20 mg twice daily  We will do testing to make sure that the H. Pylori is gone no sooner than 4 weeks after completion of antibiotics. You will want to be off of PPIs such as omeprazole and esomeprazole for at least 1-2 weeks prior to providing a stool sample.     Follow-up: 1 week check-in; eradication testing no sooner than 4 weeks after completion of antibiotics.     EDUCATION:     We reviewed H pylori today including background information, indications for treatment and potential modes of transmission. We also discussed potential for antibiotic resistance and importance of finishing treatment if able, as well as follow-up testing. Discussed rifabutin triple therapy today including medications, dosing, side effects, precautions and general counseling information.    SUBJECTIVE/OBJECTIVE:                          Ranjit Mojica is a 18 year old male coming in for an initial visit. He was referred to me from Poppy Gomez MD.  [id number]    Reason for visit: refractory H. Pylori treatment.    Allergies/ADRs: Reviewed in chart  Past Medical History:  Reviewed in chart  Tobacco: He reports that he has never smoked. He has never been exposed to tobacco smoke. He has never used smokeless tobacco.  Alcohol: none      Medication Adherence/Access: no issues reported    H. Pylori:   Famotidine 20 mg twice daily    Dietary education was provided including avoiding salty or fatty foods, processed meats, coffee, spicy foods.     Reports no adherence concerns with previous treatment regimens.     Previous treatment regimens:   6/6/2023: Metronidazole 500 mg 3 times a day, clarithromycin 500 mg twice daily, omeprazole 20 mg twice daily, amoxicillin 500 mg twice daily  4/12/2023: levofloxacin 500 mg daily, amoxicillin 750 mg TID, and omeprazole 40 mg TWICE DAILY  10/13/22: bismuth subsalicylate 524 mg four times daily, tetracycline 500 mg four times daily, metronidazole 500 mg four times daily, omeprazole 20 mg twice daily x 10 days    Previous Macrolide exposure: yes, clarithromycin and azithromycin  Penicillin Allergy: NKDA  KIDNEY: No known chronic kidney disease  LIVER: No known chronic liver disease, LFTs normal 2/14/23  Drug-drug interactions: No dairy, multivitamins or antacids     GFR Estimate   Date Value Ref Range Status   02/14/2023 >90 >60 mL/min/1.73m2 Final     Comment:     eGFR calculated using 2021 CKD-EPI equation.     Lab Results   Component Value Date    WBC 4.3 12/18/2023     Lab Results   Component Value Date    RBC 5.58 12/18/2023     Lab Results   Component Value Date    HGB 15.7 12/18/2023     Lab Results   Component Value Date    HCT 46.4 12/18/2023     Lab Results   Component Value Date    MCV 83 12/18/2023     Lab Results   Component Value Date    MCH 28.1 12/18/2023     Lab Results   Component Value Date    MCHC 33.8 12/18/2023     Lab Results   Component Value Date    RDW 11.8 12/18/2023     Lab Results   Component Value Date     12/18/2023     Wt 66.5 kg (146 lb 11.2 oz)   BMI 23.86 kg/m      ----------------      I spent 30 minutes with  this patient today. All changes were made via verbal approval with Edith Ivy MD who was covering for referring provider Poppy Gomez MD. A copy of the visit note was provided to the patient's provider(s).    A summary of these recommendations was sent via [employer].    Teto Boyce, PharmD, [facility]  [facility] Pharmacist   [facility]  Phone: [phone]         Medication Therapy Recommendations  No medication therapy recommendations to display

## 2023-12-28 ENCOUNTER — HOSPITAL ENCOUNTER (OUTPATIENT)
Facility: AMBULATORY SURGERY CENTER | Age: 18
Discharge: HOME | End: 2023-12-28
Attending: INTERNAL MEDICINE | Admitting: INTERNAL MEDICINE
Payer: COMMERCIAL

## 2023-12-28 ENCOUNTER — ANESTHESIA (OUTPATIENT)
Dept: SURGERY | Facility: AMBULATORY SURGERY CENTER | Age: 18
End: 2023-12-28
Payer: COMMERCIAL

## 2023-12-28 ENCOUNTER — ANESTHESIA EVENT (OUTPATIENT)
Dept: SURGERY | Facility: AMBULATORY SURGERY CENTER | Age: 18
End: 2023-12-28
Payer: COMMERCIAL

## 2023-12-28 VITALS
HEIGHT: 66 IN | TEMPERATURE: 97.1 F | RESPIRATION RATE: 16 BRPM | DIASTOLIC BLOOD PRESSURE: 70 MMHG | HEART RATE: 105 BPM | WEIGHT: 146.7 LBS | SYSTOLIC BLOOD PRESSURE: 107 MMHG | OXYGEN SATURATION: 100 % | BODY MASS INDEX: 23.58 KG/M2

## 2023-12-28 VITALS — HEART RATE: 75 BPM

## 2023-12-28 LAB — UPPER GI ENDOSCOPY: NORMAL

## 2023-12-28 PROCEDURE — 88305 TISSUE EXAM BY PATHOLOGIST: CPT | Mod: TC | Performed by: INTERNAL MEDICINE

## 2023-12-28 PROCEDURE — 88305 TISSUE EXAM BY PATHOLOGIST: CPT | Mod: 26 | Performed by: PATHOLOGY

## 2023-12-28 PROCEDURE — 43239 EGD BIOPSY SINGLE/MULTIPLE: CPT | Performed by: INTERNAL MEDICINE

## 2023-12-28 RX ORDER — NALOXONE HYDROCHLORIDE 0.4 MG/ML
0.4 INJECTION, SOLUTION INTRAMUSCULAR; INTRAVENOUS; SUBCUTANEOUS
Status: DISCONTINUED | OUTPATIENT
Start: 2023-12-28 | End: 2023-12-29 | Stop reason: HOSPADM

## 2023-12-28 RX ORDER — ONDANSETRON 2 MG/ML
4 INJECTION INTRAMUSCULAR; INTRAVENOUS EVERY 6 HOURS PRN
Status: DISCONTINUED | OUTPATIENT
Start: 2023-12-28 | End: 2023-12-29 | Stop reason: HOSPADM

## 2023-12-28 RX ORDER — ONDANSETRON 4 MG/1
4 TABLET, ORALLY DISINTEGRATING ORAL EVERY 6 HOURS PRN
Status: DISCONTINUED | OUTPATIENT
Start: 2023-12-28 | End: 2023-12-29 | Stop reason: HOSPADM

## 2023-12-28 RX ORDER — PROPOFOL 10 MG/ML
INJECTION, EMULSION INTRAVENOUS CONTINUOUS PRN
Status: DISCONTINUED | OUTPATIENT
Start: 2023-12-28 | End: 2023-12-28

## 2023-12-28 RX ORDER — SODIUM CHLORIDE, SODIUM LACTATE, POTASSIUM CHLORIDE, CALCIUM CHLORIDE 600; 310; 30; 20 MG/100ML; MG/100ML; MG/100ML; MG/100ML
INJECTION, SOLUTION INTRAVENOUS CONTINUOUS PRN
Status: DISCONTINUED | OUTPATIENT
Start: 2023-12-28 | End: 2023-12-28

## 2023-12-28 RX ORDER — PROCHLORPERAZINE MALEATE 10 MG
10 TABLET ORAL EVERY 6 HOURS PRN
Status: DISCONTINUED | OUTPATIENT
Start: 2023-12-28 | End: 2023-12-29 | Stop reason: HOSPADM

## 2023-12-28 RX ORDER — LIDOCAINE HYDROCHLORIDE 20 MG/ML
INJECTION, SOLUTION INFILTRATION; PERINEURAL PRN
Status: DISCONTINUED | OUTPATIENT
Start: 2023-12-28 | End: 2023-12-28

## 2023-12-28 RX ORDER — PROPOFOL 10 MG/ML
INJECTION, EMULSION INTRAVENOUS PRN
Status: DISCONTINUED | OUTPATIENT
Start: 2023-12-28 | End: 2023-12-28

## 2023-12-28 RX ORDER — NALOXONE HYDROCHLORIDE 0.4 MG/ML
0.2 INJECTION, SOLUTION INTRAMUSCULAR; INTRAVENOUS; SUBCUTANEOUS
Status: DISCONTINUED | OUTPATIENT
Start: 2023-12-28 | End: 2023-12-29 | Stop reason: HOSPADM

## 2023-12-28 RX ORDER — RIFABUTIN 150 MG/1
300 CAPSULE ORAL DAILY
Qty: 28 CAPSULE | Refills: 0 | Status: SHIPPED | OUTPATIENT
Start: 2023-12-28 | End: 2024-02-15

## 2023-12-28 RX ORDER — FLUMAZENIL 0.1 MG/ML
0.2 INJECTION, SOLUTION INTRAVENOUS
Status: ACTIVE | OUTPATIENT
Start: 2023-12-28 | End: 2023-12-28

## 2023-12-28 RX ORDER — AMOXICILLIN 250 MG/1
750 CAPSULE ORAL 3 TIMES DAILY
Qty: 126 CAPSULE | Refills: 0 | Status: SHIPPED | OUTPATIENT
Start: 2023-12-28 | End: 2024-02-15

## 2023-12-28 RX ORDER — SODIUM CHLORIDE, SODIUM LACTATE, POTASSIUM CHLORIDE, CALCIUM CHLORIDE 600; 310; 30; 20 MG/100ML; MG/100ML; MG/100ML; MG/100ML
500 INJECTION, SOLUTION INTRAVENOUS CONTINUOUS
Status: DISCONTINUED | OUTPATIENT
Start: 2023-12-28 | End: 2023-12-28 | Stop reason: HOSPADM

## 2023-12-28 RX ORDER — LIDOCAINE 40 MG/G
CREAM TOPICAL
Status: DISCONTINUED | OUTPATIENT
Start: 2023-12-28 | End: 2023-12-28 | Stop reason: HOSPADM

## 2023-12-28 RX ORDER — ONDANSETRON 2 MG/ML
4 INJECTION INTRAMUSCULAR; INTRAVENOUS
Status: DISCONTINUED | OUTPATIENT
Start: 2023-12-28 | End: 2023-12-28 | Stop reason: HOSPADM

## 2023-12-28 RX ADMIN — SODIUM CHLORIDE, SODIUM LACTATE, POTASSIUM CHLORIDE, CALCIUM CHLORIDE 500 ML: 600; 310; 30; 20 INJECTION, SOLUTION INTRAVENOUS at 09:37

## 2023-12-28 RX ADMIN — PROPOFOL 250 MCG/KG/MIN: 10 INJECTION, EMULSION INTRAVENOUS at 09:52

## 2023-12-28 RX ADMIN — SODIUM CHLORIDE, SODIUM LACTATE, POTASSIUM CHLORIDE, CALCIUM CHLORIDE: 600; 310; 30; 20 INJECTION, SOLUTION INTRAVENOUS at 09:47

## 2023-12-28 RX ADMIN — LIDOCAINE HYDROCHLORIDE 100 MG: 20 INJECTION, SOLUTION INFILTRATION; PERINEURAL at 09:52

## 2023-12-28 RX ADMIN — PROPOFOL 120 MG: 10 INJECTION, EMULSION INTRAVENOUS at 09:52

## 2023-12-28 NOTE — H&P
Ranjit Mojica  [id number]  male  18 year old      Reason for procedure/surgery: H Pylori    Patient Active Problem List   Diagnosis    Encounter for health examination of refugee    H. pylori infection    Episode of recurrent major depressive disorder, unspecified depression episode severity (H24)    MILDRED (generalized anxiety disorder)       Past Surgical History:  History reviewed. No pertinent surgical history.    Past Medical History:   Past Medical History:   Diagnosis Date    Bilateral leg numbness 05/04/2023    H. pylori infection        Social History:   Social History     Tobacco Use    Smoking status: Never     Passive exposure: Never    Smokeless tobacco: Never   Substance Use Topics    Alcohol use: Never       Family History:   Family History   Problem Relation Age of Onset    Heart Disease Other     Diabetes Other     Cancer Other     Glaucoma No family hx of     Macular Degeneration No family hx of        Allergies: No Known Allergies    Active Medications:   Current Outpatient Medications   Medication Sig Dispense Refill    acetaminophen (TYLENOL) 500 MG tablet Take 1-2 tablets (500-1,000 mg) by mouth every 6 hours as needed for mild pain 90 tablet 3    famotidine (PEPCID) 20 MG tablet Take 1 tablet (20 mg) by mouth 2 times daily 90 tablet 3    ibuprofen (ADVIL/MOTRIN) 400 MG tablet Take 1 tablet (400 mg) by mouth every 6 hours as needed for moderate pain 60 tablet 0    benzonatate (TESSALON) 200 MG capsule Take 1 capsule (200 mg) by mouth 3 times daily as needed for cough 30 capsule 0    fluticasone (FLONASE) 50 MCG/ACT nasal spray Spray 2 sprays into both nostrils daily 16 g 3       Systemic Review:   CONSTITUTIONAL: NEGATIVE for fever, chills, change in weight  ENT/MOUTH: NEGATIVE for ear, mouth and throat problems  RESP: NEGATIVE for significant cough or SOB  CV: NEGATIVE for chest pain, palpitations or peripheral edema    Physical Examination:   Vital Signs: /78 (BP Location: Right arm)    "Pulse 105   Temp 97.5  F (36.4  C) (Temporal)   Resp 18   Ht 1.67 m (5' 5.75\")   Wt 66.5 kg (146 lb 11.2 oz)   SpO2 97%   BMI 23.86 kg/m    GENERAL: healthy, alert and no distress  NECK: no adenopathy, no asymmetry, masses, or scars  RESP: lungs clear to auscultation - no rales, rhonchi or wheezes  CV: regular rate and rhythm, normal S1 S2, no S3 or S4, no murmur, click or rub, no peripheral edema and peripheral pulses strong  ABDOMEN: soft, nontender, no hepatosplenomegaly, no masses and bowel sounds normal  MS: no gross musculoskeletal defects noted, no edema    Plan: Appropriate to proceed as scheduled.      Zaira John MD  12/28/2023    PCP:  Poppy Gomez   "

## 2023-12-28 NOTE — PATIENT INSTRUCTIONS
"Recommendations from today's MTM visit:                                                      Will plan to start rifabutin triple therapy x14 days  Rifabutin 300 mg once daily  Amoxicillin 750 mg three times daily  Esomeprazole 20 mg twice daily  We will do testing to make sure that the H. Pylori is gone no sooner than 4 weeks after completion of antibiotics. You will want to be off of PPIs such as omeprazole and esomeprazole for at least 1-2 weeks prior to providing a stool sample.     Follow-up: 1 week check-in; eradication testing no sooner than 4 weeks after completion of antibiotics.     It was great speaking with you today.  I value your experience and would be very thankful for your time in providing feedback in our clinic survey. In the next few days, you may receive an email or text message from [employer] with a link to a survey related to your  clinical pharmacist.\"     To schedule another MTM appointment, please call the clinic directly or you may call the MTM scheduling line at [phone] or toll-free at [phone].     My Clinical Pharmacist's contact information:                                                      Please feel free to contact me with any questions or concerns you have.      Teto Boyec, PharmD, BCPS  MTM Pharmacist   [facility]  Phone: [phone]   "

## 2023-12-28 NOTE — ANESTHESIA PREPROCEDURE EVALUATION
Anesthesia Pre-Procedure Evaluation    Patient: Ranjit Mojica   MRN: [id number] : 2005        Procedure : Procedure(s):  Esophagoscopy, gastroscopy, duodenoscopy (EGD), combined          Past Medical History:   Diagnosis Date     Bilateral leg numbness 2023     H. pylori infection       No past surgical history on file.   No Known Allergies   Social History     Tobacco Use     Smoking status: Never     Passive exposure: Never     Smokeless tobacco: Never   Substance Use Topics     Alcohol use: Never      Wt Readings from Last 1 Encounters:   23 66.5 kg (146 lb 11.2 oz)        Anesthesia Evaluation            ROS/MED HX  ENT/Pulmonary: Comment: Frequent nose bleeds      Neurologic:  - neg neurologic ROS     Cardiovascular:  - neg cardiovascular ROS     METS/Exercise Tolerance: >4 METS    Hematologic:  - neg hematologic  ROS     Musculoskeletal:  - neg musculoskeletal ROS     GI/Hepatic: Comment: H. Pylori needs EGD      Renal/Genitourinary:  - neg Renal ROS     Endo:  - neg endo ROS     Psychiatric/Substance Use:  - neg psychiatric ROS     Infectious Disease:  - neg infectious disease ROS     Malignancy:  - neg malignancy ROS     Other:  - neg other ROS          Physical Exam    Airway        Mallampati: I   TM distance: > 3 FB   Neck ROM: full   Mouth opening: > 3 cm    Respiratory Devices and Support         Dental       (+) Minor Abnormalities - some fillings, tiny chips      Cardiovascular   cardiovascular exam normal          Pulmonary   pulmonary exam normal            OUTSIDE LABS:  CBC:   Lab Results   Component Value Date    WBC 4.3 2023    WBC 3.2 (L) 2023    HGB 15.7 2023    HGB 15.6 2023    HCT 46.4 2023    HCT 45.9 2023     2023     (L) 2023     BMP:   Lab Results   Component Value Date     2023     05/10/2022    POTASSIUM 4.2 2023    POTASSIUM 4.2 05/10/2022    CHLORIDE 104 2023    CHLORIDE  "102 05/10/2022    CO2 27 02/14/2023    CO2 25 05/10/2022    BUN 12.5 02/14/2023    BUN 10 05/10/2022    CR 0.85 02/14/2023    CR 0.79 05/10/2022    GLC 82 02/14/2023    GLC 79 05/10/2022     COAGS: No results found for: \"PTT\", \"INR\", \"FIBR\"  POC: No results found for: \"BGM\", \"HCG\", \"HCGS\"  HEPATIC:   Lab Results   Component Value Date    ALBUMIN 4.4 02/14/2023    PROTTOTAL 6.9 02/14/2023    ALT 16 02/14/2023    AST 22 02/14/2023    ALKPHOS 108 02/14/2023    BILITOTAL 0.4 02/14/2023     OTHER:   Lab Results   Component Value Date    KERRIE 9.6 02/14/2023    TSH 0.53 02/14/2023       Anesthesia Plan    ASA Status:  1    NPO Status:  NPO Appropriate    Anesthesia Type: MAC.     - Reason for MAC: immobility needed              Consents    Anesthesia Plan(s) and associated risks, benefits, and realistic alternatives discussed. Questions answered and patient/representative(s) expressed understanding.     - Discussed: Risks, Benefits and Alternatives for BOTH SEDATION and the PROCEDURE were discussed     - Discussed with:  Patient            Postoperative Care    Pain management: IV analgesics, Oral pain medications.        Comments:               Angela Parada MD    I have reviewed the pertinent notes and labs in the chart from the past 30 days and (re)examined the patient.  Any updates or changes from those notes are reflected in this note.                  "

## 2023-12-28 NOTE — ANESTHESIA POSTPROCEDURE EVALUATION
Patient: Ranjit Mojica    Procedure: Procedure(s):  Esophagoscopy, gastroscopy, duodenoscopy (EGD), combined with biopsy       Anesthesia Type:  MAC    Note:  Disposition: Outpatient   Postop Pain Control: Uneventful            Sign Out: Well controlled pain   PONV: No   Neuro/Psych: Uneventful            Sign Out: Acceptable/Baseline neuro status   Airway/Respiratory: Uneventful            Sign Out: Acceptable/Baseline resp. status   CV/Hemodynamics: Uneventful            Sign Out: Acceptable CV status; No obvious hypovolemia; No obvious fluid overload   Other NRE: NONE   DID A NON-ROUTINE EVENT OCCUR? No       Last vitals:  Vitals Value Taken Time   /70 12/28/23 1042   Temp 36.2  C (97.1  F) 12/28/23 1042   Pulse     Resp 16 12/28/23 1042   SpO2 100 % 12/28/23 1042       Electronically Signed By: Angela Parada MD  December 28, 2023  11:36 AM

## 2023-12-28 NOTE — ANESTHESIA CARE TRANSFER NOTE
Patient: Ranjit Mojica    Procedure: Procedure(s):  Esophagoscopy, gastroscopy, duodenoscopy (EGD), combined with biopsy       Diagnosis: H. pylori infection [A04.8]  Gastroesophageal reflux disease, unspecified whether esophagitis present [K21.9]  Diagnosis Additional Information: No value filed.    Anesthesia Type:   MAC     Note:      Level of Consciousness: drowsy  Oxygen Supplementation: room air    Independent Airway: airway patency satisfactory and stable        Patient transferred to: Phase II    Handoff Report: Identifed the Patient, Identified the Reponsible Provider, Reviewed the pertinent medical history, Discussed the surgical course, Reviewed Intra-OP anesthesia mangement and issues during anesthesia, Set expectations for post-procedure period and Allowed opportunity for questions and acknowledgement of understanding      Vitals:  Vitals Value Taken Time   BP     Temp     Pulse     Resp     SpO2         Electronically Signed By: RADHA Hunt CRNA  December 28, 2023  10:06 AM

## 2023-12-29 ENCOUNTER — TELEPHONE (OUTPATIENT)
Dept: GASTROENTEROLOGY | Facility: CLINIC | Age: 18
End: 2023-12-29
Payer: COMMERCIAL

## 2023-12-29 ENCOUNTER — TELEPHONE (OUTPATIENT)
Dept: MULTI SPECIALTY CLINIC | Facility: CLINIC | Age: 18
End: 2023-12-29
Payer: COMMERCIAL

## 2023-12-29 DIAGNOSIS — A04.8 H. PYLORI INFECTION: Primary | ICD-10-CM

## 2023-12-29 LAB
PATH REPORT.COMMENTS IMP SPEC: NORMAL
PATH REPORT.COMMENTS IMP SPEC: NORMAL
PATH REPORT.FINAL DX SPEC: NORMAL
PATH REPORT.GROSS SPEC: NORMAL
PATH REPORT.MICROSCOPIC SPEC OTHER STN: NORMAL
PATH REPORT.RELEVANT HX SPEC: NORMAL
PHOTO IMAGE: NORMAL

## 2023-12-29 NOTE — TELEPHONE ENCOUNTER
Called patient with [facility]  to go over endoscopy results - persistent H Pylori positivity.    He has been treated twice in the past for H pylori with the regimens below - he reports adherence.  10/2022: Omeprazole, bismuth, tetracycline and flagyl     3/2023: Levofloxacin 500mg daily; amoxicillin 750mg three times daily, and omeprazole 40mg twice daily.    Given he has persistent H Pylori positivity on endoscopy, I would recommend that he see ID for his next treatment; this order was placed.     All questions and concerns were addressed.

## 2023-12-29 NOTE — TELEPHONE ENCOUNTER
Called patient with [facility] . Informed him that rifabutin triple therapy has been sent to pharmacy. I also informed him that his infectious disease consult has been canceled, per verbal agreement with Dr. John that [facility] will manage at this time. Plan to continue as described in my 12/27 visit note.     Teto Boyce, PharmD, BCPS  [facility] Pharmacist   [facility]  Phone: [phone]

## 2024-01-02 ENCOUNTER — OFFICE VISIT (OUTPATIENT)
Dept: OTOLARYNGOLOGY | Facility: CLINIC | Age: 19
End: 2024-01-02
Attending: FAMILY MEDICINE
Payer: COMMERCIAL

## 2024-01-02 VITALS
BODY MASS INDEX: 23.82 KG/M2 | HEART RATE: 96 BPM | OXYGEN SATURATION: 98 % | HEIGHT: 66 IN | WEIGHT: 148.2 LBS | DIASTOLIC BLOOD PRESSURE: 72 MMHG | SYSTOLIC BLOOD PRESSURE: 115 MMHG

## 2024-01-02 DIAGNOSIS — R04.0 EPISTAXIS: Primary | ICD-10-CM

## 2024-01-02 PROCEDURE — 99213 OFFICE O/P EST LOW 20 MIN: CPT | Mod: 25 | Performed by: STUDENT IN AN ORGANIZED HEALTH CARE EDUCATION/TRAINING PROGRAM

## 2024-01-02 PROCEDURE — 31231 NASAL ENDOSCOPY DX: CPT | Performed by: STUDENT IN AN ORGANIZED HEALTH CARE EDUCATION/TRAINING PROGRAM

## 2024-01-02 RX ORDER — MUPIROCIN 20 MG/G
OINTMENT TOPICAL
Qty: 22 G | Refills: 0 | Status: SHIPPED | OUTPATIENT
Start: 2024-01-02 | End: 2024-01-12

## 2024-01-02 RX ORDER — ECHINACEA PURPUREA EXTRACT 125 MG
TABLET ORAL
Qty: 30 ML | Refills: 3 | Status: SHIPPED | OUTPATIENT
Start: 2024-01-02

## 2024-01-02 NOTE — PROGRESS NOTES
[facility] - Rhinology  New Patient Visit      Encounter date:   January 2, 2024    Referring Provider:  Shabbir Villalobos MD  [address] [place], [place] [id number]    Assessment, Decision Making, and Plan:  (R04.0) Epistaxis  (primary encounter diagnosis)  Comment:   --endoscopy with complex septal deviation but no polyps  --intermittent, from either side  --low volume and resolves with pressure  Plan: IMAGESTREAM RECORDING ORDER  -- mupirocin BID x 14 days  --thereafter ocean spray  --can follow up as needed    Chief Complaint: epistaxis    History of Present Illness:   Obtained with   Ranjit Mojica is a 18 year old male who presents for consultation regarding epistaxis.    Has happened intermittently for years  Can be either side  Most recently was from the right side last Friday  They last for a few minutes and resolve with pressure    He is not on any blood thinners    He has not had any nasal trauma or sinonasal surgery    Review of systems: A 14-point review of systems has been conducted and was negative for any notable symptoms, except as dictated in the history of present illness.     Medical History:  Past Medical History:   Diagnosis Date    Bilateral leg numbness 05/04/2023    H. pylori infection         Surgical History:   Past Surgical History:   Procedure Laterality Date    ESOPHAGOSCOPY, GASTROSCOPY, DUODENOSCOPY (EGD), COMBINED N/A 12/28/2023    Procedure: Esophagoscopy, gastroscopy, duodenoscopy (EGD), combined with biopsy;  Surgeon: Zaira John MD;  Location: [facility] OR        Family History:  Family History   Problem Relation Age of Onset    Heart Disease Other     Diabetes Other     Cancer Other     Glaucoma No family hx of     Macular Degeneration No family hx of         Social History:   Social History     Socioeconomic History    Marital status: Single   Tobacco Use    Smoking status: Never     Passive exposure: Never    Smokeless tobacco: Never   Vaping  "Use    Vaping Use: Never used   Substance and Sexual Activity    Alcohol use: Never    Drug use: Never     Social Determinants of Health     Interpersonal Safety: Low Risk  (11/7/2023)    Interpersonal Safety     Do you feel physically and emotionally safe where you currently live?: Yes     Within the past 12 months, have you been hit, slapped, kicked or otherwise physically hurt by someone?: No     Within the past 12 months, have you been humiliated or emotionally abused in other ways by your partner or ex-partner?: No        Physical Exam:  Vital signs: /72 (BP Location: Left arm, Patient Position: Sitting, Cuff Size: Adult Regular)   Pulse 96   Ht 1.676 m (5' 6\")   Wt 67.2 kg (148 lb 3.2 oz)   SpO2 98%   BMI 23.92 kg/m     General Appearance: No acute distress, appropriate demeanor, conversant  Eyes: moist conjunctivae; EOMI; pupils symmetric; visual acuity grossly intact; no proptosis  Head: normocephalic; overall symmetric appearance without deformity  Face: overall symmetric without deformity  Ears: Normal appearance of external ear; external meatus normal in appearance; TMs intact without perforation bilaterally;   Nose: No external deformity; septum DNS to the left the INV, broad low ridge (cartilage off crest) with apex into the MM/IT, area of erythematous and crusted mucosa right anterior, MM clear  Oral Cavity/oropharynx: Normal appearance of mucosa; tongue midline; no mass or lesions; oropharynx without obvious mucosal abnormality  Neck: no palpable lymphadenopathy; thyroid without palpable nodules  Lungs: symmetric chest rise; no wheezing  CV: Good distal perfusion; normal hear rate  Extremities: No deformity  Neurologic Exam: Cranial nerves II-XII are grossly intact; no focal deficit    Procedure Note  Procedure performed: Rigid nasal endoscopy  Indication: To evaluate for sinonasal pathology not visualized on routine anterior rhinoscopy  Anesthesia: 4% topical lidocaine with 0.05% " oxymetazoline  Description of procedure: A 30 degree, 3 mm rigid endoscope was inserted into bilateral nasal cavities and the nasal valves, nasal cavity, middle meatus, sphenoethmoid recess, and nasopharynx were thoroughly evaluated for evidence of obstruction, edema, purulence, polyps and/or mass/lesion.     Findings:    septum DNS to the left the INV, broad low ridge (cartilage off crest) with apex into the MM/IT, area of erythematous and crusted mucosa right anterior, MM clear    NP clear bilaterally  SER on the right clear, unable to visualize fully on the left    Keyon Tadeo MD    [facility]  Rhinology  Endoscopic Skull Base Surgery  [facility]  Department of Otolaryngology - Head & Neck Surgery

## 2024-01-02 NOTE — LETTER
1/2/2024       RE: Ranjit Mojica  [address]  [place] [address]     Dear Colleague,    Thank you for referring your patient, Ranjit Mojica, to the [facility] [place] at . Please see a copy of my visit note below.      [facility] - Rhinology  New Patient Visit      Encounter date:   January 2, 2024    Referring Provider:  Shabbir Villalobos MD  [address] [place] [id number]    Assessment, Decision Making, and Plan:  (R04.0) Epistaxis  (primary encounter diagnosis)  Comment:   --endoscopy with complex septal deviation but no polyps  --intermittent, from either side  --low volume and resolves with pressure  Plan: IMAGESTREAM RECORDING ORDER  -- mupirocin BID x 14 days  --thereafter ocean spray  --can follow up as needed    Chief Complaint: epistaxis    History of Present Illness:   Obtained with   Ranjit Mojica is a 18 year old male who presents for consultation regarding epistaxis.    Has happened intermittently for years  Can be either side  Most recently was from the right side last Friday  They last for a few minutes and resolve with pressure    He is not on any blood thinners    He has not had any nasal trauma or sinonasal surgery    Review of systems: A 14-point review of systems has been conducted and was negative for any notable symptoms, except as dictated in the history of present illness.     Medical History:  Past Medical History:   Diagnosis Date    Bilateral leg numbness 05/04/2023    H. pylori infection         Surgical History:   Past Surgical History:   Procedure Laterality Date    ESOPHAGOSCOPY, GASTROSCOPY, DUODENOSCOPY (EGD), COMBINED N/A 12/28/2023    Procedure: Esophagoscopy, gastroscopy, duodenoscopy (EGD), combined with biopsy;  Surgeon: Zaira John MD;  Location: [facility] OR        Family History:  Family History   Problem Relation Age of Onset  "   Heart Disease Other     Diabetes Other     Cancer Other     Glaucoma No family hx of     Macular Degeneration No family hx of         Social History:   Social History     Socioeconomic History    Marital status: Single   Tobacco Use    Smoking status: Never     Passive exposure: Never    Smokeless tobacco: Never   Vaping Use    Vaping Use: Never used   Substance and Sexual Activity    Alcohol use: Never    Drug use: Never     Social Determinants of Health     Interpersonal Safety: Low Risk  (11/7/2023)    Interpersonal Safety     Do you feel physically and emotionally safe where you currently live?: Yes     Within the past 12 months, have you been hit, slapped, kicked or otherwise physically hurt by someone?: No     Within the past 12 months, have you been humiliated or emotionally abused in other ways by your partner or ex-partner?: No        Physical Exam:  Vital signs: /72 (BP Location: Left arm, Patient Position: Sitting, Cuff Size: Adult Regular)   Pulse 96   Ht 1.676 m (5' 6\")   Wt 67.2 kg (148 lb 3.2 oz)   SpO2 98%   BMI 23.92 kg/m     General Appearance: No acute distress, appropriate demeanor, conversant  Eyes: moist conjunctivae; EOMI; pupils symmetric; visual acuity grossly intact; no proptosis  Head: normocephalic; overall symmetric appearance without deformity  Face: overall symmetric without deformity  Ears: Normal appearance of external ear; external meatus normal in appearance; TMs intact without perforation bilaterally;   Nose: No external deformity; septum DNS to the left the INV, broad low ridge (cartilage off crest) with apex into the MM/IT, area of erythematous and crusted mucosa right anterior, MM clear  Oral Cavity/oropharynx: Normal appearance of mucosa; tongue midline; no mass or lesions; oropharynx without obvious mucosal abnormality  Neck: no palpable lymphadenopathy; thyroid without palpable nodules  Lungs: symmetric chest rise; no wheezing  CV: Good distal perfusion; normal " hear rate  Extremities: No deformity  Neurologic Exam: Cranial nerves II-XII are grossly intact; no focal deficit    Procedure Note  Procedure performed: Rigid nasal endoscopy  Indication: To evaluate for sinonasal pathology not visualized on routine anterior rhinoscopy  Anesthesia: 4% topical lidocaine with 0.05% oxymetazoline  Description of procedure: A 30 degree, 3 mm rigid endoscope was inserted into bilateral nasal cavities and the nasal valves, nasal cavity, middle meatus, sphenoethmoid recess, and nasopharynx were thoroughly evaluated for evidence of obstruction, edema, purulence, polyps and/or mass/lesion.     Findings:    septum DNS to the left the INV, broad low ridge (cartilage off crest) with apex into the MM/IT, area of erythematous and crusted mucosa right anterior, MM clear    NP clear bilaterally  SER on the right clear, unable to visualize fully on the left    Keyon Tadeo MD    [facility]  Rhinology  Endoscopic Skull Base Surgery  [facility]  Department of Otolaryngology - Head & Neck Surgery        Again, thank you for allowing me to participate in the care of your patient.      Sincerely,    Keyon Tadeo MD

## 2024-01-02 NOTE — NURSING NOTE
"Chief Complaint   Patient presents with    Consult   Blood pressure 115/72, pulse 96, height 1.676 m (5' 6\"), weight 67.2 kg (148 lb 3.2 oz), SpO2 98%. Sukhjinder Cortes, EMT    "

## 2024-01-02 NOTE — PATIENT INSTRUCTIONS
You were seen in the ENT Clinic today by Dr. Tadeo. If you have any questions or concerns after your appointment, please contact us (see below)    The following has been recommended for you based upon your appointment today:  Prescription sent to the pharmacy    Please return to clinic as needed    How to Contact Us:  Send a pluriSelectt message to your provider. Our team will respond to you via Kii. Occasionally, we will need to call you to get further information.  For urgent matters (Monday-Friday), call the ENT Clinic: [phone] and speak with a call center team member - they will route your call appropriately.   If you'd like to speak directly with a nurse, please find our contact information below. We do our best to check voicemail frequently throughout the day, and will work to call you back within 1-2 days. For urgent matters, please use the general clinic phone numbers listed above.    Sohbha RIVERO RN, BSN   RN Care Coordinator, ENT Clinic  [facility]  Direct: [phone]  Darling BILL LPN  Direct: [phone]

## 2024-01-08 ENCOUNTER — TELEPHONE (OUTPATIENT)
Dept: GASTROENTEROLOGY | Facility: CLINIC | Age: 19
End: 2024-01-08
Payer: COMMERCIAL

## 2024-01-08 DIAGNOSIS — A04.8 H. PYLORI INFECTION: Primary | ICD-10-CM

## 2024-01-08 NOTE — TELEPHONE ENCOUNTER
[facility]  ID#[id number] used. Started on January 3rd, taking as directed, no side effects or concerns. Still has stomach pain. Discussed dicyclomine as a possible treatment option, I've sent a message to Dr. Gomez for approval. Stool antigen testing no sooner than 4 weeks after completion of H. Pylori treatment, should be off PPIs for 1-2 weeks prior to eradication testing.    Verbal agreement with Dr. Gomez reached on dicyclomine. Prescription sent with this encounter.  --  Poppy Gomez MD [place], [facility] Dr. Boyce,    That sounds great! Thank you for taking such great care of Ranjit.    Poppy Gomez        ----- Message -----  From: Teto Boyce RPH  Sent: 1/8/2024   1:40 PM CST  To: Poppy Gomez MD    Hi Dr. Gomez:    Touching base on Ranjit. He started the rifabutin triple therapy on January 3rd, taking it as directed and tolerating it well. He reports that he is still having abdominal pain. I'd like to start a small supply of dicyclomine 10 mg four times daily PRN. I can send the script if you agree. Thank you!      Rick Boyce, PharmD, BCPS  [facility] Pharmacist   [facility]  Phone: [phone]

## 2024-01-09 RX ORDER — DICYCLOMINE HYDROCHLORIDE 10 MG/1
10 CAPSULE ORAL 4 TIMES DAILY PRN
Qty: 40 CAPSULE | Refills: 0 | Status: SHIPPED | OUTPATIENT
Start: 2024-01-09

## 2024-01-19 ENCOUNTER — PRE VISIT (OUTPATIENT)
Dept: OTOLARYNGOLOGY | Facility: CLINIC | Age: 19
End: 2024-01-19

## 2024-02-02 ENCOUNTER — ALLIED HEALTH/NURSE VISIT (OUTPATIENT)
Dept: FAMILY MEDICINE | Facility: CLINIC | Age: 19
End: 2024-02-02
Payer: COMMERCIAL

## 2024-02-02 VITALS
BODY MASS INDEX: 22.52 KG/M2 | HEIGHT: 67 IN | TEMPERATURE: 97.2 F | WEIGHT: 143.5 LBS | OXYGEN SATURATION: 97 % | HEART RATE: 72 BPM | DIASTOLIC BLOOD PRESSURE: 71 MMHG | SYSTOLIC BLOOD PRESSURE: 111 MMHG | RESPIRATION RATE: 20 BRPM

## 2024-02-02 DIAGNOSIS — Z23 ENCOUNTER FOR IMMUNIZATION: Primary | ICD-10-CM

## 2024-02-02 PROCEDURE — 90480 ADMN SARSCOV2 VAC 1/ONLY CMP: CPT

## 2024-02-02 PROCEDURE — 99207 PR NO CHARGE NURSE ONLY: CPT

## 2024-02-02 PROCEDURE — 91320 SARSCV2 VAC 30MCG TRS-SUC IM: CPT

## 2024-02-02 NOTE — PROGRESS NOTES
Prior to immunization administration, verified patients identity using patient s name and date of birth. Please see Immunization Activity for additional information.     Screening Questionnaire for Adult Immunization    Are you sick today?   No   Do you have allergies to medications, food, a vaccine component or latex?   No   Have you ever had a serious reaction after receiving a vaccination?   No   Do you have a long-term health problem with heart, lung, kidney, or metabolic disease (e.g., diabetes), asthma, a blood disorder, no spleen, complement component deficiency, a cochlear implant, or a spinal fluid leak?  Are you on long-term aspirin therapy?   No   Do you have cancer, leukemia, HIV/AIDS, or any other immune system problem?   No   Do you have a parent, brother, or sister with an immune system problem?   No   In the past 3 months, have you taken medications that affect  your immune system, such as prednisone, other steroids, or anticancer drugs; drugs for the treatment of rheumatoid arthritis, Crohn s disease, or psoriasis; or have you had radiation treatments?   No   Have you had a seizure, or a brain or other nervous system problem?   No   During the past year, have you received a transfusion of blood or blood    products, or been given immune (gamma) globulin or antiviral drug?   No   For women: Are you pregnant or is there a chance you could become       pregnant during the next month?   No   Have you received any vaccinations in the past 4 weeks?   No     Immunization questionnaire answers were all negative.    I have reviewed the following standing orders:   This patient is due and qualifies for the Covid-19 vaccine.     Click here for COVID-19 Standing Order    I have reviewed the vaccines inclusion and exclusion criteria; No concerns regarding eligibility.     Patient instructed to remain in clinic for 15 minutes afterwards, and to report any adverse reactions.     Screening performed by Tyra Villanueva  MA on 2/2/2024 at 1:44 PM.

## 2024-02-15 ENCOUNTER — TELEPHONE (OUTPATIENT)
Dept: GASTROENTEROLOGY | Facility: CLINIC | Age: 19
End: 2024-02-15
Payer: COMMERCIAL

## 2024-02-15 DIAGNOSIS — A04.8 H. PYLORI INFECTION: Primary | ICD-10-CM

## 2024-02-15 NOTE — TELEPHONE ENCOUNTER
Called patient with [facility] . Spoke with Ranjit, he completed H. Pylori rifabutin triple therapy x 2 weeks on 1/17/24. Due for eradication testing no sooner than 2/14/24. Order placed with this encounter. Confirms he is not taking PPI in past 2 weeks.    Rick Boyce, PharmD, BCPS  [facility] Pharmacist   [facility]  Phone: [phone]

## 2024-05-07 ENCOUNTER — OFFICE VISIT (OUTPATIENT)
Dept: FAMILY MEDICINE | Facility: CLINIC | Age: 19
End: 2024-05-07
Payer: COMMERCIAL

## 2024-05-07 VITALS
TEMPERATURE: 97.6 F | RESPIRATION RATE: 16 BRPM | SYSTOLIC BLOOD PRESSURE: 127 MMHG | OXYGEN SATURATION: 99 % | BODY MASS INDEX: 23.37 KG/M2 | HEART RATE: 71 BPM | HEIGHT: 66 IN | WEIGHT: 145.4 LBS | DIASTOLIC BLOOD PRESSURE: 76 MMHG

## 2024-05-07 DIAGNOSIS — K59.00 CONSTIPATION, UNSPECIFIED CONSTIPATION TYPE: ICD-10-CM

## 2024-05-07 DIAGNOSIS — L65.9 HAIR LOSS: ICD-10-CM

## 2024-05-07 DIAGNOSIS — R10.13 DYSPEPSIA: ICD-10-CM

## 2024-05-07 DIAGNOSIS — L71.9 ROSACEA: Primary | ICD-10-CM

## 2024-05-07 PROCEDURE — 99214 OFFICE O/P EST MOD 30 MIN: CPT | Mod: 25

## 2024-05-07 PROCEDURE — 90713 POLIOVIRUS IPV SC/IM: CPT

## 2024-05-07 PROCEDURE — 90471 IMMUNIZATION ADMIN: CPT

## 2024-05-07 RX ORDER — POLYETHYLENE GLYCOL 3350 17 G/17G
1 POWDER, FOR SOLUTION ORAL DAILY
Qty: 578 G | Refills: 2 | Status: SHIPPED | OUTPATIENT
Start: 2024-05-07

## 2024-05-07 RX ORDER — AZELAIC ACID 0.15 G/G
GEL TOPICAL
Qty: 50 G | Refills: 3 | Status: SHIPPED | OUTPATIENT
Start: 2024-05-07

## 2024-05-07 NOTE — PROGRESS NOTES
Prior to immunization administration, verified patients identity using patient s name and date of birth. Please see Immunization Activity for additional information.     Screening Questionnaire for Adult Immunization    Are you sick today?   No   Do you have allergies to medications, food, a vaccine component or latex?   No   Have you ever had a serious reaction after receiving a vaccination?   No   Do you have a long-term health problem with heart, lung, kidney, or metabolic disease (e.g., diabetes), asthma, a blood disorder, no spleen, complement component deficiency, a cochlear implant, or a spinal fluid leak?  Are you on long-term aspirin therapy?   No   Do you have cancer, leukemia, HIV/AIDS, or any other immune system problem?   No   Do you have a parent, brother, or sister with an immune system problem?   No   In the past 3 months, have you taken medications that affect  your immune system, such as prednisone, other steroids, or anticancer drugs; drugs for the treatment of rheumatoid arthritis, Crohn s disease, or psoriasis; or have you had radiation treatments?   No   Have you had a seizure, or a brain or other nervous system problem?   No   During the past year, have you received a transfusion of blood or blood    products, or been given immune (gamma) globulin or antiviral drug?   No   For women: Are you pregnant or is there a chance you could become       pregnant during the next month?   No   Have you received any vaccinations in the past 4 weeks?   No     Immunization questionnaire answers were all negative.      Patient instructed to remain in clinic for 15 minutes afterwards, and to report any adverse reactions.     Screening performed by Bulmaro Muñiz CMA on 5/7/2024 at 9:34 AM.

## 2024-05-07 NOTE — PROGRESS NOTES
Assessment & Plan     Rosacea  -Patient's physical exam today is most consistent with rosacea, mild, will send in a prescription for azelaic acid to his pharmacy on file which he can apply topically.  Follow-up on an as-needed basis.  Verbalized clear understanding and agreement to this treatment plan and had no further concerns at this time.  - azelaic acid (FINACIA) 15 % external gel  Dispense: 50 g; Refill: 3    Dyspepsia  -History of treatment resistant H. pylori.  He was seen by GI and was treated with rifabutin triple therapy.  Eradication testing has not yet been performed.  Patient is due to leave the country in approximately 7 days for total of 4 months.  Given this, I recommend he resume his omeprazole and schedule an appointment after he has returned, further evaluation and management of this concern. I recommended that 2 weeks prior to his return, he can discontinue omeprazole and we can perform eradication testing and see about setting him up with an appointment with GI.  Patient verbalized clear understanding and agreement to this treatment plan, follow-up, and had no further questions or concerns at this time  - omeprazole (PRILOSEC) 20 MG DR capsule  Dispense: 90 capsule; Refill: 1    Constipation, unspecified constipation type  -Discussed with patient, that is abdominal discomfort, may be related to dyspepsia/H. pylori as above, however, I suspect a portion of his abdominal discomfort is related to chronic constipation.  He affirms difficulty passing stool and infrequent bowel movements.  I recommended we trial daily MiraLAX which she can uptitrate or down titrate on an as-needed basis to see if this improves some of his abdominal discomfort.  No peritoneal or focal signs on abdominal exam today, so less likely acute abdominal pathology.  Recommend follow-up on an as-needed basis or with GI as above.   - polyethylene glycol (MIRALAX) 17 GM/Dose powder  Dispense: 578 g; Refill: 2    Hair  Loss  -Patient affirms some mild hair loss.  Does not appreciate that it is coming from 1 specific spot on his head.  He states that after showering he will notice some hair on his hands or when he runs his hand through his head.  He thinks this might be slightly increased from normal.  Discussed possible etiologies including but not limited to male pattern baldness versus telogen effluvium given recent acute illness.  Recommended conservative management and follow-up on an as-needed basis if hair loss worsens or fails to improve.  Patient verbalized clear understanding and agreement to this treatment plan and had no further questions or concerns at this time    The longitudinal plan of care for the diagnosis(es)/condition(s) as documented were addressed during this visit. Due to the added complexity in care, I will continue to support Ranjit in the subsequent management and with ongoing continuity of care.    No follow-ups on file.    Subjective   Ranjit is a 19 year old, presenting for the following health issues:  Abdominal Pain (Had questions about his Hpylori- states that the treatment is not helping), Hair/Scalp Problem (Hair loss), and Derm Problem (Ck spots on face)    HPI   Recently completed H. Pylori rifabutin triple therapy per GI x 2 weeks. Due for eradication [id number]. He reports that for almost two years that he has been dealing with H pylori and abdominal discomfort/dyspepsia.  He is frustrated that his symptoms have continued given multiple attempts at eradication.  Unfortunately, he is due to leave the country in approximately 7 days, which will inhibit his ability to have close follow-up regarding this complaint.  Separately, he reports that he believes he is having some hair loss.  He reports that he notices hair on his hands during showering and when he runs his hands through his hair.  He believes this hair loss is slightly increased from normal.  He reports a recent upper  "respiratory tract infection about a week ago.  Separately, he reports a rash on his face underneath his eyes and over the bridge of his nose which has been present for approximately 1-1/2 months.  He denies that this rash is itchy.  He denies headache, fever, chills, nausea, vomiting, shortness of breath, chest pain.  He affirms constipation and difficulty passing a bowel movement.      Review of Systems  Constitutional, HEENT, cardiovascular, pulmonary, gi and gu systems are negative, except as otherwise noted.  ROS reviewed, see HPI for pertinent positives and negatives.  Domenico Lawsoner, DO        Objective    /76   Pulse 71   Temp 97.6  F (36.4  C) (Oral)   Resp 16   Ht 1.676 m (5' 6\")   Wt 66 kg (145 lb 6.4 oz)   SpO2 99%   BMI 23.47 kg/m    Body mass index is 23.47 kg/m .  Physical Exam  Constitutional:       General: He is not in acute distress.     Appearance: He is normal weight. He is not toxic-appearing or diaphoretic.   HENT:      Head: Normocephalic and atraumatic.      Comments: -Negative for any focal pattern of hair loss     Right Ear: External ear normal.      Left Ear: External ear normal.      Nose: Nose normal. No congestion.      Mouth/Throat:      Mouth: Mucous membranes are moist.   Eyes:      Extraocular Movements: Extraocular movements intact.   Cardiovascular:      Rate and Rhythm: Normal rate.      Pulses: Normal pulses.      Heart sounds: No murmur heard.     No friction rub. No gallop.   Pulmonary:      Effort: Pulmonary effort is normal. No respiratory distress.   Abdominal:      General: Abdomen is flat. There is no distension.      Tenderness: There is abdominal tenderness. There is no guarding or rebound.      Comments: -mild abdominal discomfort to palpation in all four quadrants.  No epigastric tenderness.   Skin:     Findings: Rash present.      Comments: -Mild, slightly reddened rash present over the nasal bridge and underneath the eyelids bilaterally   Neurological: "      General: No focal deficit present.      Mental Status: He is alert and oriented to person, place, and time.   Psychiatric:         Mood and Affect: Mood normal.         Behavior: Behavior normal.              Precepted with Dr. Ben López MD    Signed Electronically by: Domenico Leiva DO

## 2024-05-19 ENCOUNTER — HEALTH MAINTENANCE LETTER (OUTPATIENT)
Age: 19
End: 2024-05-19

## 2025-01-21 ENCOUNTER — OFFICE VISIT (OUTPATIENT)
Dept: FAMILY MEDICINE | Facility: CLINIC | Age: 20
End: 2025-01-21
Payer: COMMERCIAL

## 2025-01-21 VITALS
TEMPERATURE: 97.1 F | DIASTOLIC BLOOD PRESSURE: 88 MMHG | RESPIRATION RATE: 16 BRPM | SYSTOLIC BLOOD PRESSURE: 124 MMHG | OXYGEN SATURATION: 99 % | HEART RATE: 89 BPM | WEIGHT: 150 LBS | BODY MASS INDEX: 24.21 KG/M2

## 2025-01-21 DIAGNOSIS — H53.10 EYE STRAIN, BILATERAL: ICD-10-CM

## 2025-01-21 DIAGNOSIS — A04.8 H. PYLORI INFECTION: ICD-10-CM

## 2025-01-21 DIAGNOSIS — R52 GENERALIZED BODY ACHES: Primary | ICD-10-CM

## 2025-01-21 LAB
ALBUMIN SERPL BCG-MCNC: 4.7 G/DL (ref 3.5–5.2)
ALBUMIN UR-MCNC: NEGATIVE MG/DL
ALP SERPL-CCNC: 121 U/L (ref 40–150)
ALT SERPL W P-5'-P-CCNC: 21 U/L (ref 0–70)
ANION GAP SERPL CALCULATED.3IONS-SCNC: 7 MMOL/L (ref 7–15)
APPEARANCE UR: CLEAR
AST SERPL W P-5'-P-CCNC: 19 U/L (ref 0–45)
BASOPHILS # BLD AUTO: 0 10E3/UL (ref 0–0.2)
BASOPHILS NFR BLD AUTO: 1 %
BILIRUB SERPL-MCNC: 0.5 MG/DL
BILIRUB UR QL STRIP: NEGATIVE
BUN SERPL-MCNC: 8.4 MG/DL (ref 6–20)
CALCIUM SERPL-MCNC: 9.9 MG/DL (ref 8.8–10.4)
CHLORIDE SERPL-SCNC: 102 MMOL/L (ref 98–107)
CK SERPL-CCNC: 61 U/L (ref 39–308)
COLOR UR AUTO: YELLOW
CREAT SERPL-MCNC: 0.88 MG/DL (ref 0.67–1.17)
CRP SERPL-MCNC: <3 MG/L
EGFRCR SERPLBLD CKD-EPI 2021: >90 ML/MIN/1.73M2
EOSINOPHIL # BLD AUTO: 0.1 10E3/UL (ref 0–0.7)
EOSINOPHIL NFR BLD AUTO: 2 %
ERYTHROCYTE [DISTWIDTH] IN BLOOD BY AUTOMATED COUNT: 11.8 % (ref 10–15)
ERYTHROCYTE [SEDIMENTATION RATE] IN BLOOD BY WESTERGREN METHOD: 4 MM/HR (ref 0–15)
GLUCOSE SERPL-MCNC: 87 MG/DL (ref 70–99)
GLUCOSE UR STRIP-MCNC: NEGATIVE MG/DL
HCO3 SERPL-SCNC: 31 MMOL/L (ref 22–29)
HCT VFR BLD AUTO: 51.8 % (ref 40–53)
HGB BLD-MCNC: 17.2 G/DL (ref 13.3–17.7)
HGB UR QL STRIP: NEGATIVE
IMM GRANULOCYTES # BLD: 0 10E3/UL
IMM GRANULOCYTES NFR BLD: 0 %
KETONES UR STRIP-MCNC: NEGATIVE MG/DL
LEUKOCYTE ESTERASE UR QL STRIP: NEGATIVE
LYMPHOCYTES # BLD AUTO: 1.4 10E3/UL (ref 0.8–5.3)
LYMPHOCYTES NFR BLD AUTO: 22 %
MCH RBC QN AUTO: 28 PG (ref 26.5–33)
MCHC RBC AUTO-ENTMCNC: 33.2 G/DL (ref 31.5–36.5)
MCV RBC AUTO: 84 FL (ref 78–100)
MONOCYTES # BLD AUTO: 0.5 10E3/UL (ref 0–1.3)
MONOCYTES NFR BLD AUTO: 8 %
NEUTROPHILS # BLD AUTO: 4.2 10E3/UL (ref 1.6–8.3)
NEUTROPHILS NFR BLD AUTO: 68 %
NITRATE UR QL: NEGATIVE
PH UR STRIP: 5.5 [PH] (ref 5–8)
PLATELET # BLD AUTO: 162 10E3/UL (ref 150–450)
POTASSIUM SERPL-SCNC: 4.3 MMOL/L (ref 3.4–5.3)
PROT SERPL-MCNC: 7.7 G/DL (ref 6.4–8.3)
RBC # BLD AUTO: 6.15 10E6/UL (ref 4.4–5.9)
SODIUM SERPL-SCNC: 140 MMOL/L (ref 135–145)
SP GR UR STRIP: 1.02 (ref 1–1.03)
TSH SERPL DL<=0.005 MIU/L-ACNC: 1.78 UIU/ML (ref 0.3–4.2)
UROBILINOGEN UR STRIP-ACNC: 0.2 E.U./DL
WBC # BLD AUTO: 6.1 10E3/UL (ref 4–11)

## 2025-01-21 PROCEDURE — 99214 OFFICE O/P EST MOD 30 MIN: CPT | Mod: GC

## 2025-01-21 PROCEDURE — 36415 COLL VENOUS BLD VENIPUNCTURE: CPT

## 2025-01-21 PROCEDURE — 82550 ASSAY OF CK (CPK): CPT

## 2025-01-21 PROCEDURE — 86140 C-REACTIVE PROTEIN: CPT

## 2025-01-21 PROCEDURE — 84443 ASSAY THYROID STIM HORMONE: CPT

## 2025-01-21 PROCEDURE — 81003 URINALYSIS AUTO W/O SCOPE: CPT

## 2025-01-21 PROCEDURE — 85025 COMPLETE CBC W/AUTO DIFF WBC: CPT

## 2025-01-21 PROCEDURE — 80053 COMPREHEN METABOLIC PANEL: CPT

## 2025-01-21 PROCEDURE — 85652 RBC SED RATE AUTOMATED: CPT

## 2025-01-21 PROCEDURE — 86038 ANTINUCLEAR ANTIBODIES: CPT

## 2025-01-21 ASSESSMENT — PATIENT HEALTH QUESTIONNAIRE - PHQ9
SUM OF ALL RESPONSES TO PHQ QUESTIONS 1-9: 15
SUM OF ALL RESPONSES TO PHQ QUESTIONS 1-9: 15
10. IF YOU CHECKED OFF ANY PROBLEMS, HOW DIFFICULT HAVE THESE PROBLEMS MADE IT FOR YOU TO DO YOUR WORK, TAKE CARE OF THINGS AT HOME, OR GET ALONG WITH OTHER PEOPLE: VERY DIFFICULT

## 2025-01-21 NOTE — PATIENT INSTRUCTIONS
Thank you for trusting us with your care.    We are referring you to the following service:   Ophthalmology -- Vision and eye concerns    Here is the contact information:  Eye: [facility] Eye -- [facility]: [address] [place] [address]: [phone]    Please call the specialty clinic or imaging center to arrange your referral appointment.  Let us know us you have any questions.

## 2025-01-21 NOTE — PROGRESS NOTES
Assessment & Plan     Generalized body aches  Unclear from patient beyond 1 month history of body aches. Differential is broad at this point. Patient will follow up on 2/10 about this. Broad lab work ordered today. Further work up could include a spinal xray to look for ankylosing spondylitis (for patient has eye concerns). No skin or genital lesions. No lesions in the mouth. Less worried about Behçet's disease.  - TSH with free T4 reflex  - Comprehensive metabolic panel  - CBC with Platelets & Differential  - CRP inflammation  - Erythrocyte sedimentation rate auto  - CK total  - Antinuclear Ab Cascade  - Urine Macroscopic with reflex to Microscopic  - Helicobacter pylori Antigen Stool    Eye strain, bilateral  Patient doesn't have itchy irritated eyes making anterior uvietis less likely. Patient doesn't eat pork making trichinella unlikely. Patient states he has headaches when he wears his glasses so he has been not wearing them. High suspicion for problem with the strength of glasses.   - Adult Eye  Referral    H. Pylori Infection  Patient previously tested positive for H Pylori states he had some form of treatment here as well as in [place]. Will retest this today to see if this has been eradicated and will initiate quad therapy if not.     No follow-ups on file.    Subjective   Ranjit is a 19 year old, presenting for the following health issues:  Pain (Body pain. Has been a month. ) and Eye Problem        1/21/2025     9:15 AM   Additional Questions   Roomed by Neville MULLEN   Accompanied by Self         1/21/2025    Information    services provided? Yes     HPI     Patient has been having body aches and pain in his eyes. Body aches have been going on for 1 month, describes it as entire body pain, back, shoulders, elbows, legs. Patient previously worked nights and then went to the school during the day. No eye itchiness.     Answers submitted by the patient for this visit:  Patient  Health Questionnaire (Submitted on 1/21/2025)  If you checked off any problems, how difficult have these problems made it for you to do your work, take care of things at home, or get along with other people?: Very difficult  PHQ9 TOTAL SCORE: 15      Objective    /88   Pulse 89   Temp 97.1  F (36.2  C) (Tympanic)   Resp 16   Wt 68 kg (150 lb)   SpO2 99%   BMI 24.21 kg/m    Body mass index is 24.21 kg/m .  Physical Exam   GENERAL: Awake, alert, No acute distress.   HEENT: No scleral icterus or conjunctival injection. Oral cavity moist and pink with no ulcers, exudate, or thrush present. No cervical or supraclavicular lymphadenopathy.  SKIN: Warm and dry. No bruises, rashes, or skin lesions.  LUNGS: Normal work of breathing with no use of accessory muscles. Clear breath sounds in all lung fields bilaterally with no wheezes or crackles appreciated.  CARDIAC: RRR. Normal S1 and S2. No murmurs, clicks, or rubs appreciated. No peripheral edema.  ABDOMEN: Non-distended. Soft, slight tenderness throughout. No organomegaly on exam.   NEUROLOGIC: Alert and oriented. Sensation to light touch involving upper and lower extremities intact bilaterally.   EXTREMITIES: No gross deformity or peripheral edema. Appear well-perfused.         Signed Electronically by: Leland Velasco MD  Staffed with Alberto Keenan MD

## 2025-01-22 LAB — ANA SER QL IF: NEGATIVE

## 2025-01-22 PROCEDURE — 87338 HPYLORI STOOL AG IA: CPT

## 2025-01-23 LAB — H PYLORI AG STL QL IA: POSITIVE

## 2025-01-28 NOTE — PROGRESS NOTES
Physician Attestation   I, Alberto Keenan MD, saw this patient and agree with the findings and plan of care as documented in the note.      Items personally reviewed/procedural attestation: vitals.    Alberto Keenan MD

## 2025-02-10 ENCOUNTER — OFFICE VISIT (OUTPATIENT)
Dept: PHARMACY | Facility: CLINIC | Age: 20
End: 2025-02-10

## 2025-02-10 ENCOUNTER — TELEPHONE (OUTPATIENT)
Dept: FAMILY MEDICINE | Facility: CLINIC | Age: 20
End: 2025-02-10

## 2025-02-10 ENCOUNTER — OFFICE VISIT (OUTPATIENT)
Dept: FAMILY MEDICINE | Facility: CLINIC | Age: 20
End: 2025-02-10
Payer: COMMERCIAL

## 2025-02-10 VITALS
HEART RATE: 78 BPM | TEMPERATURE: 97.9 F | SYSTOLIC BLOOD PRESSURE: 118 MMHG | WEIGHT: 153.8 LBS | HEIGHT: 67 IN | DIASTOLIC BLOOD PRESSURE: 75 MMHG | BODY MASS INDEX: 24.14 KG/M2 | OXYGEN SATURATION: 98 % | RESPIRATION RATE: 12 BRPM

## 2025-02-10 VITALS
HEART RATE: 78 BPM | BODY MASS INDEX: 24.14 KG/M2 | TEMPERATURE: 97.9 F | WEIGHT: 153.8 LBS | OXYGEN SATURATION: 98 % | RESPIRATION RATE: 12 BRPM | HEIGHT: 67 IN | SYSTOLIC BLOOD PRESSURE: 118 MMHG | DIASTOLIC BLOOD PRESSURE: 75 MMHG

## 2025-02-10 DIAGNOSIS — R52 GENERALIZED BODY ACHES: ICD-10-CM

## 2025-02-10 DIAGNOSIS — R06.02 SHORTNESS OF BREATH: ICD-10-CM

## 2025-02-10 DIAGNOSIS — H53.10 EYE STRAIN, BILATERAL: ICD-10-CM

## 2025-02-10 DIAGNOSIS — A04.8 H. PYLORI INFECTION: Primary | ICD-10-CM

## 2025-02-10 DIAGNOSIS — A04.8 POSITIVE H. PYLORI TEST: Primary | ICD-10-CM

## 2025-02-10 PROCEDURE — 99207 PR NO CHARGE LOS: CPT

## 2025-02-10 RX ORDER — BISMUTH SUBSALICYLATE 262 MG/1
2 TABLET, CHEWABLE ORAL
Qty: 112 TABLET | Refills: 0 | Status: SHIPPED | OUTPATIENT
Start: 2025-02-10 | End: 2025-02-24

## 2025-02-10 RX ORDER — METRONIDAZOLE 500 MG/1
500 TABLET ORAL 3 TIMES DAILY
Qty: 42 TABLET | Refills: 0 | Status: SHIPPED | OUTPATIENT
Start: 2025-02-10 | End: 2025-02-24

## 2025-02-10 RX ORDER — OMEPRAZOLE 20 MG/1
20 CAPSULE, DELAYED RELEASE ORAL 2 TIMES DAILY
Qty: 28 CAPSULE | Refills: 0 | Status: SHIPPED | OUTPATIENT
Start: 2025-02-10 | End: 2025-02-24

## 2025-02-10 RX ORDER — TETRACYCLINE HYDROCHLORIDE 500 MG/1
1 TABLET, FILM COATED ORAL 4 TIMES DAILY
Qty: 56 TABLET | Refills: 0 | Status: SHIPPED | OUTPATIENT
Start: 2025-02-10 | End: 2025-02-24

## 2025-02-10 NOTE — PATIENT INSTRUCTIONS
Plan:  1.  4 H. Pylori medications from the pharmacy, but do not start taking  2. Bring medications to appointment on 2/17 with pharmacist

## 2025-02-10 NOTE — TELEPHONE ENCOUNTER
Prior Authorization needed on:  Tetracycline HCl 500 MG TABS     Medication/Dose:  Tetracycline HCl 500 MG TABS    Pharmacy confirmed as   [id number] - [place], [place] - [address] [place] [address]  Phone: [phone] Fax: [phone]  : Yes    Insurance Name:  [facility]  Insurance Patient ID: [id number]    Alternatives Suggested:  Please advise if you would like to start a PA or send an alternative: Please route to your MA. Thank you.     Tyra Villanueva MA February 10, 2025 at 3:05 PM

## 2025-02-10 NOTE — TELEPHONE ENCOUNTER
Prior Authorization Specialty Medication Request    Medication/Dose: Tetracycline HCl 500 MG TABS  Diagnosis and ICD code (if different than what is on RX):    Positive H. pylori test [A04.8]  - Primary     New/renewal/insurance change PA/secondary ins. PA:  Previously Tried and Failed:      Important Lab Values: Tetracycline is the guideline recommended treatment per ACG guidelines for the quadruple therapy of treatment of H. pylori, doxycycline is not a recommended substitution for tetracycline.    Rationale:     Insurance   Primary: [facility]  Insurance ID:  [id number]    Secondary (if applicable):  Insurance ID:      Pharmacy Information (if different than what is on RX)  Name:  [facility]  Phone:  [phone]   Fax:    [phone]       Clinic Information  Preferred routing pool for dept communication: loco

## 2025-02-10 NOTE — PROGRESS NOTES
I have verified the content of the note, which accurately reflects my assessment of the patient and the plan of care.   Jacquelin Johnston, [facility], PharmD

## 2025-02-10 NOTE — PATIENT INSTRUCTIONS
Plan:  1.  4 H. Pylori medications from the pharmacy, but do not start taking   - call the clinic and let us know if you are not able to get all 4 of the medications  2. Bring medications to appointment on 2/17 with pharmacist     Thank you for trusting us with your care.    We are referring you to the following service:   Ophthalmology -- Vision and eye concerns    Here is the contact information:  Eye: [facility] Eye -- [facility]: [address] [place] [address]: [phone]    Please call the specialty clinic or imaging center to arrange your referral appointment.  Let us know us you have any questions.

## 2025-02-10 NOTE — PROGRESS NOTES
Clinical Pharmacy Consult:                                                    Ranjit Mojica is a 19 year old male seen for a clinical pharmacist consult.  He was referred to me from Dr. Salinas.     Reason for Consult: H. Pylori medication education     Discussion: Patient was prescribed Bismuth Quad therapy for H. pylori infection diagnosis on 1/22/25. Patient does not bring in medications with him as he was not aware that he was positive for H. Pylori or that medications had been sent to the pharmacy for him. Patient was informed that he needs treatment for H. Pylori and that Dr. Gomez will be sending in new medications for him that he should pick them up, but not start taking them until he is seen again for pillbox setup. Informed patient that there have been issues with one of the medications for H.pylori being covered so if he can not get all 4 medications, that he needs to call the clinic and let us know. Patient vocalized his understanding.     Plan:  1.  4 H. Pylori medications from the pharmacy, but do not start taking  2. Bring medications to appointment on 2/17 with pharmacist     Lyly Howard, PharmD   Medication Therapy Management Pharmacy Resident

## 2025-02-10 NOTE — PROGRESS NOTES
Assessment & Plan     Positive H. pylori test  Discussed positive result for H. pylori.  Has previously been treated, uncertain how many times or with what.  Patient did not  medications today, per pharmacy the medication that was prescribed has .  Will represcribe it today.  Discussed picking up medications and bringing to pharmacy appointment next week to set up pillbox.  Patient stated understanding.    - metroNIDAZOLE (FLAGYL) 500 MG tablet; Take 1 tablet (500 mg) by mouth 3 times daily for 14 days.  - bismuth subsalicylate (PEPTO BISMOL) 262 MG chewable tablet; Take 2 tablets (524 mg) by mouth 4 times daily (before meals and nightly) for 14 days.  - omeprazole (PRILOSEC) 20 MG DR capsule; Take 1 capsule (20 mg) by mouth 2 times daily for 14 days.  - Tetracycline HCl 500 MG TABS; Take 1 tablet by mouth 4 times daily for 14 days.  - Follow-up with pharmacy next week to set up a pillbox    Shortness of breath  Could be URI but dry cough.  Will not test for anything today.  Discussed symptomatic management.  Reasonable to do a spirometry as patient has not had this before and has a family history of asthma.  Patient request spirometry.  Will have this scheduled prior to pharmacy appointment next week.    - BRONCHODILATION RESPONSE, PRE/POST ADMIN; Future    Eyestrain, bilateral  Provided patient with phone number to call to schedule eye appointment from previous referral.    Generalized body aches  Patient had multiple labs drawn at last visit for generalized bodyaches.  States this is slightly improved.  All laboratory work except H. pylori came back normal.  Previously discussed possible additional workup such as spine x-ray to look for ankylosing spondylitis, however did not feel like this is warranted today with improving symptoms.  Reviewed laboratory workup with patient.    Carlos Eduardo Church is a 19 year old, presenting for the following health issues:  Musculoskeletal Problem (Foot pain),  "Generalized Body Aches, Results (From previous visit), Other (Concern for asthma, has been have difficulty with breathing), and Referral (Previously referral for ophthalmology, but has not hear from anyone)      2/10/2025    10:31 AM   Additional Questions   Roomed by Mimi   Accompanied by patient         2/10/2025    Information    services provided? Yes   Language Other   Other Paz   Type of interpretation provided Telephone    ID [id number]    Agency [facility]  Services     HPI   Here for follow up on lab results. He had laboratory work done and had an eye doctor referral made but was not contacted to make an appointment, would like this information. Was not contacted regarding positive h. Pylori results or that medications were sent to his pharmacy so he did not pick them up.     4-5 days have had breathing issues. 2 times an hour. Can happen randomly while walking, resting etc. Has been having a cough. Dry throat and cough. Not worse with eating. Not had this before. Dad has asthma. Has not had a fever. Sudden shortness of breath. Has never used an inhaler.       Objective    /75 (BP Location: Left arm, Patient Position: Sitting, Cuff Size: Adult Regular)   Pulse 78   Temp 97.9  F (36.6  C) (Oral)   Resp 12   Ht 1.7 m (5' 6.93\")   Wt 69.8 kg (153 lb 12.8 oz)   SpO2 98%   BMI 24.14 kg/m    Body mass index is 24.14 kg/m .  Physical Exam   GENERAL: alert and no acute distress  EYES: eyes grossly normal to inspection, PERRLA, EOM intact  HENT: hearing grossly intact, moist mucus membranes  RESP: lungs clear to auscultation - no rales, rhonchi or wheezes  CV: regular rate and rhythm, normal S1 S2, no murmur appreciated    Signed Electronically by: Poppy Gomez MD  "

## 2025-02-13 NOTE — PROGRESS NOTES
Clinical Pharmacy Consult:                                                    Ranjit Mojica is a 19 year old male seen for a clinical pharmacist consult.  He was referred to me from Dr. Gomez,     Reason for Consult: H. Pylori medication education     Discussion: Patient was prescribed Bismuth Quad therapy for H. pylori infection. Patient does bring in medications, but does not have tetracycline with him. Chart review shows that PA for tetracycline was initiated, but has not been approved or denied yet. Informed patient the pillbox could not be set up yet and that we are waiting to hear back from his insurance in order for him to get the 4th medication he needs. Told patient to not start taking any medications until he sees pharmacy again. Patient verbalized his understanding. Patient did not want to schedule another appointment until we know when he will be able to get the additional medication.     Plan:  1. I will check status of Tetracycline PA in 1 week.   2. Informed patient he will get a call in about a week to discuss status of tetracycline PA & schedule follow-up.       Lyly Howard, PharmD   Medication Therapy Management Pharmacy Resident    Preceptor cosignature: Patient was seen independently by Dr. Howard. I have reviewed the note and plan. Fadumo Voss, PharmD, BCACP

## 2025-02-14 NOTE — TELEPHONE ENCOUNTER
Retail Pharmacy Prior Authorization Team   Phone: [phone]    PA Initiation    Medication: TETRACYCLINE  MG PO TABS  Insurance Company: [employer] - Phone [phone] Fax [phone]  Pharmacy Filling the Rx: [id number] - [place], [place] - [address] [facility]  Filling Pharmacy Phone: [phone]  Filling Pharmacy Fax: [phone]  Start Date: 2/14/2025

## 2025-02-14 NOTE — NURSING NOTE
Due to patient being non-English speaking/uses sign language, an  was used for this visit. Only for face-to-face interpretation by an external agency, date and length of interpretation can be found on the scanned worksheet.    **Group interpretation was done**       name: ASIMRUSS  Language: JOSE  Agency:  [facility]  Type of interpretation:  Face-to-face, spoken      RISHI Darling  1:39 PM  5/17/2022   Signs of bleeding (nose bleeds, bleeding gums, blackened stool, unusual bruising)

## 2025-02-17 ENCOUNTER — ALLIED HEALTH/NURSE VISIT (OUTPATIENT)
Dept: FAMILY MEDICINE | Facility: CLINIC | Age: 20
End: 2025-02-17
Payer: COMMERCIAL

## 2025-02-17 ENCOUNTER — OFFICE VISIT (OUTPATIENT)
Dept: PHARMACY | Facility: CLINIC | Age: 20
End: 2025-02-17
Payer: COMMERCIAL

## 2025-02-17 VITALS
BODY MASS INDEX: 24.91 KG/M2 | TEMPERATURE: 98.2 F | RESPIRATION RATE: 20 BRPM | DIASTOLIC BLOOD PRESSURE: 72 MMHG | OXYGEN SATURATION: 99 % | WEIGHT: 155 LBS | HEART RATE: 89 BPM | SYSTOLIC BLOOD PRESSURE: 110 MMHG | HEIGHT: 66 IN

## 2025-02-17 VITALS
WEIGHT: 155 LBS | TEMPERATURE: 98.2 F | OXYGEN SATURATION: 99 % | DIASTOLIC BLOOD PRESSURE: 72 MMHG | BODY MASS INDEX: 24.91 KG/M2 | HEART RATE: 9 BPM | HEIGHT: 66 IN | RESPIRATION RATE: 20 BRPM | SYSTOLIC BLOOD PRESSURE: 110 MMHG

## 2025-02-17 DIAGNOSIS — R06.02 SHORTNESS OF BREATH: Primary | ICD-10-CM

## 2025-02-17 DIAGNOSIS — A04.8 H. PYLORI INFECTION: Primary | ICD-10-CM

## 2025-02-17 PROCEDURE — 99207 PR NO CHARGE LOS: CPT

## 2025-02-17 RX ORDER — ALBUTEROL SULFATE 90 UG/1
2 INHALANT RESPIRATORY (INHALATION) ONCE
Status: COMPLETED | OUTPATIENT
Start: 2025-02-17 | End: 2025-02-17

## 2025-02-17 RX ADMIN — ALBUTEROL SULFATE 2 PUFF: 90 INHALANT RESPIRATORY (INHALATION) at 11:10

## 2025-02-17 NOTE — Clinical Note
Eugenia Gomez,   Routing to you per [facility] policy. He has not received tetracycline so no pillbox set up today. Will check in next week on status of tetracycline & to schedule follow-up. Let me know if you have any questions!   Thanks,  Lyly

## 2025-02-17 NOTE — PATIENT INSTRUCTIONS
"  Recommendations from today's MTM visit:                                                       Plan:  1. MTM Pharmacist will check status of Tetracycline PA in 1 week.   2. Informed patient he will get a call in about a week to discuss status of tetracycline PA & schedule follow-up.     Follow-up: No follow-ups on file.      It was great speaking with you today.  I value your experience and would be very thankful for your time in providing feedback in our clinic survey. In the next few days, you may receive an email or text message from [employer] with a link to a survey related to your  clinical pharmacist.\"      To schedule another MTM appointment, please call the clinic directly or you may call the MTM scheduling line at [phone].       My Clinical Pharmacist's contact information:                                                       Please feel free to contact me with any questions or concerns you have.      Lyly Howard, PharmD   Medication Therapy Management Pharmacy Resident      "

## 2025-02-17 NOTE — PROGRESS NOTES
Due to patient being non-English speaking/uses sign language, an  was used for this visit. Only for face-to-face interpretation by an external agency, date and length of interpretation can be found on the scanned worksheet.     name: Johnson [id number]  Agency:  [facility]  Language:  Paz    Telephone number: [phone]  Type of interpretation: Face-to-face, spoken

## 2025-02-17 NOTE — Clinical Note
Unable to complete pillbox set up. I have sent a reminder to myself for next Monday to check on the status of his tetracycline.

## 2025-02-17 NOTE — PROGRESS NOTES
Clinic Administered Medication Documentation    Patient was given ALbuterol . Prior to medication administration, verified patient's identity using patient's name and date of birth.    Corry Flaherty, CMA

## 2025-02-18 NOTE — TELEPHONE ENCOUNTER
Prior Authorization Approval    Medication: TETRACYCLINE  MG PO TABS  Authorization Effective Date: 1/18/2025  Authorization Expiration Date: 3/19/2025  Approved Dose/Quantity:   Reference #:     Insurance Company: [employer] - Phone [phone] Fax [phone]  Expected CoPay: $    CoPay Card Available:      Financial Assistance Needed:   Which Pharmacy is filling the prescription: [id number] - [place], [place] - [address] [place] [address]  Pharmacy Notified: Yes  Patient Notified:

## 2025-02-19 LAB
FEF 25/75: NORMAL
FEV-1: NORMAL
FEV1/FVC: NORMAL
FVC: NORMAL

## 2025-03-06 ENCOUNTER — OFFICE VISIT (OUTPATIENT)
Dept: PHARMACY | Facility: CLINIC | Age: 20
End: 2025-03-06
Payer: COMMERCIAL

## 2025-03-06 DIAGNOSIS — A04.8 H. PYLORI INFECTION: ICD-10-CM

## 2025-03-06 DIAGNOSIS — A04.8 H. PYLORI INFECTION: Primary | ICD-10-CM

## 2025-03-06 RX ORDER — OMEPRAZOLE 20 MG/1
CAPSULE, DELAYED RELEASE ORAL
Qty: 90 CAPSULE | OUTPATIENT
Start: 2025-03-06

## 2025-03-06 RX ORDER — OMEPRAZOLE 20 MG/1
20 CAPSULE, DELAYED RELEASE ORAL DAILY
Qty: 60 CAPSULE | Refills: 3 | Status: SHIPPED | OUTPATIENT
Start: 2025-03-06

## 2025-03-06 NOTE — Clinical Note
Eugenia Gomez,   I set up Ranjit's h.pylori meds in clinic today. He will not start them until after Ramadan is over as he can't take the meds 4 times a day right now. He will schedule to follow-up in 12 weeks and I will call him after Ramadan is over to make sure he started the meds.   Thanks,  Lyly

## 2025-03-06 NOTE — PROGRESS NOTES
I have verified the content of the note, which accurately reflects my assessment of the patient and the plan of care.   Barbara Roe, [facility], PharmD

## 2025-03-06 NOTE — TELEPHONE ENCOUNTER
Name from pharmacy: OMEPRAZOLE 20MG CAPSULES         Will file in chart as: omeprazole (PRILOSEC) 20 MG DR capsule     Possible duplicate: Hover to review recent actions on this medication    Sig: TAKE ONE CAPSULE BY MOUTH EVERY DAY. USE UNTIL YOU START H-PYLORI TREATMENT REGIMEN    Disp: 90 capsule    Refills: Not specified    Start: 3/6/2025    Class: E-Prescribe    For: H. pylori infection    To pharmacy: **Patient requests 90 days supply**    Last ordered: Today (3/6/2025) by Shabbir Villalobos MD    Last refill: 3/6/2025    Rx #: [id number]    PPI Protocol [id number]/06/2025 11:32 AM   Protocol Details Medication is active on med list and the sig matches. RN to manually verify dose and sig if red X/fail.    Medication indicated for associated diagnosis            Walt Rodriguez, RN, MSN

## 2025-03-06 NOTE — PROGRESS NOTES
Clinical Pharmacy Consult:                                                    Ranjit Mojica is a 20 year old male seen for a clinical pharmacist consult.  He was referred to me from Dr. Gomez.   Call completed with  #[id number]    Reason for Consult: H. Pylori medication education     Discussion: Patient was prescribed Bismuth quad therapy for H. pylori infection diagnosis on 11/8/24. Patient does bring in medications with him.  Discussed and verified understanding of what H. Pylori is and what their positive test means. Went over medications with patient, including what they are, how to take them, and what potential adverse effects to expect. Importance of taking all medications as directed and until gone was explained and emphasized. Discussed what to do in case of missed doses. Emphasized that they will likely get side effects but it is very important to get through it and take all the medication until it is gone. Discussed taking with food or snack may help side effects.     Patient currently observing Ramadan and expressed that he would not be able to take medications 4 times per day. Plan to have patient wait until after Ramadan is over to begin treatment. Patient having stomach pains currently with eating, will send in more omeprazole for patient to use until he is able to start H. Pylori regimen.     Patient was provided a pill box today: Yes  Medications were set up on a pill box today: Yes  Instructed patient to call the clinic and ask for a nurse if they have intolerable side effects, rather than just stopping: Yes    Plan:  1. Use omeprazole 20mg twice daily until you start H. Pylori treatment   2. START medications after the end of Ramadan, Then cotinue all medications until they are gone.   3. If any doses were missed, continue taking those pills until no more are left. You may need to be taking the medicine for longer than 2 weeks if you have missed doses.  4. Return to clinic in 12 weeks  (6 weeks after therapy is complete) to make sure H. Pylori infection is gone. Make an appointment with your physician.   - delayed until 12 weeks out as patient will not start therapy until after Ramadan is complete.       Lyly Howard, PharmD   Medication Therapy Management Pharmacy Resident       Medical Necessity Information: It is in your best interest to select a reason for this procedure from the list below. All of these items fulfill various CMS LCD requirements except the new and changing color options. Post-Care Instructions: I reviewed with the patient in detail post-care instructions. Patient is to wear sunprotection, and avoid picking at any of the treated lesions. Pt may apply Vaseline to crusted or scabbing areas. Duration Of Freeze Thaw-Cycle (Seconds): 5-10 Number Of Freeze-Thaw Cycles: 2 freeze-thaw cycles Render Post-Care Instructions In Note?: yes Medical Necessity Clause: This procedure was medically necessary because the lesions that were treated were: Include Z78.9 (Other Specified Conditions Influencing Health Status) As An Associated Diagnosis?: No Consent: The patient's consent was obtained including but not limited to risks of crusting, scabbing, blistering, scarring, darker or lighter pigmentary change, recurrence, incomplete removal and infection. Number Of Freeze-Thaw Cycles: 3 freeze-thaw cycles Duration Of Freeze Thaw-Cycle (Seconds): 5 Detail Level: Simple Detail Level: Detailed

## 2025-03-06 NOTE — PATIENT INSTRUCTIONS
"  Recommendations from today's MTM visit:                                                      1. Use omeprazole 20mg twice daily until you start H. Pylori treatment   2. START medications after the end of Ramadan, Then cotinue all medications until they are gone.   3. If any doses were missed, continue taking those pills until no more are left. You may need to be taking the medicine for longer than 2 weeks if you have missed doses.  4. Return to clinic in 12 weeks (6 weeks after therapy is complete) to make sure H. Pylori infection is gone. Make an appointment with your physician.              - delayed until 12 weeks out as patient will not start therapy until after Ramadan is complete.     Follow-up: Follow-up with PCP in 12 weeks for test of cure     It was great speaking with you today.  I value your experience and would be very thankful for your time in providing feedback in our clinic survey. In the next few days, you may receive an email or text message from [employer] with a link to a survey related to your  clinical pharmacist.\"      To schedule another MTM appointment, please call the clinic directly or you may call the MTM scheduling line at [phone].       My Clinical Pharmacist's contact information:                                                       Please feel free to contact me with any questions or concerns you have.      Lyly Howard, PharmD   Medication Therapy Management Pharmacy Resident      "

## 2025-06-08 ENCOUNTER — HEALTH MAINTENANCE LETTER (OUTPATIENT)
Age: 20
End: 2025-06-08

## (undated) DEVICE — SOL WATER IRRIG 500ML BOTTLE 2F7113

## (undated) DEVICE — ENDO BITE BLOCK ADULT OMNI-BLOC

## (undated) DEVICE — SYR 30ML SLIP TIP W/O NDL 302833

## (undated) DEVICE — TUBING SUCTION MEDI-VAC 1/4"X20' N620A

## (undated) DEVICE — KIT ENDO FIRST STEP DISINFECTANT 200ML W/POUCH EP-4

## (undated) DEVICE — KIT ENDO TURNOVER/PROCEDURE CARRY-ON 101822

## (undated) DEVICE — SUCTION MANIFOLD NEPTUNE 2 SYS 1 PORT 702-025-000

## (undated) DEVICE — SUCTION CATH AIRLIFE TRI-FLO W/CONTROL PORT 14FR  T60C

## (undated) DEVICE — ENDO FORCEP BX CAPTURA PRO SPIKE G50696